# Patient Record
Sex: FEMALE | Race: WHITE | Employment: PART TIME | URBAN - METROPOLITAN AREA
[De-identification: names, ages, dates, MRNs, and addresses within clinical notes are randomized per-mention and may not be internally consistent; named-entity substitution may affect disease eponyms.]

---

## 2017-05-22 PROBLEM — R42 LIGHTHEADEDNESS: Status: ACTIVE | Noted: 2017-05-22

## 2017-05-22 PROBLEM — J45.909 ASTHMA: Status: ACTIVE | Noted: 2017-05-22

## 2017-05-22 PROBLEM — M54.50 CHRONIC LOW BACK PAIN: Status: ACTIVE | Noted: 2017-05-22

## 2017-05-22 PROBLEM — G89.29 CHRONIC LOW BACK PAIN: Status: ACTIVE | Noted: 2017-05-22

## 2017-05-22 PROBLEM — I10 HTN (HYPERTENSION): Status: ACTIVE | Noted: 2017-05-22

## 2018-06-01 ENCOUNTER — OFFICE VISIT (OUTPATIENT)
Dept: ENT CLINIC | Age: 48
End: 2018-06-01
Payer: COMMERCIAL

## 2018-06-01 ENCOUNTER — PROCEDURE VISIT (OUTPATIENT)
Dept: AUDIOLOGY | Age: 48
End: 2018-06-01
Payer: COMMERCIAL

## 2018-06-01 VITALS
HEART RATE: 67 BPM | HEIGHT: 64 IN | WEIGHT: 193.8 LBS | DIASTOLIC BLOOD PRESSURE: 54 MMHG | SYSTOLIC BLOOD PRESSURE: 128 MMHG | BODY MASS INDEX: 33.09 KG/M2

## 2018-06-01 DIAGNOSIS — R09.81 CHRONIC NASAL CONGESTION: ICD-10-CM

## 2018-06-01 DIAGNOSIS — R51.9 CHRONIC FACIAL PAIN: Primary | ICD-10-CM

## 2018-06-01 DIAGNOSIS — G89.29 CHRONIC FACIAL PAIN: Primary | ICD-10-CM

## 2018-06-01 DIAGNOSIS — R42 VERTIGO: Primary | ICD-10-CM

## 2018-06-01 PROCEDURE — G8427 DOCREV CUR MEDS BY ELIG CLIN: HCPCS | Performed by: OTOLARYNGOLOGY

## 2018-06-01 PROCEDURE — 92557 COMPREHENSIVE HEARING TEST: CPT | Performed by: AUDIOLOGIST

## 2018-06-01 PROCEDURE — 3017F COLORECTAL CA SCREEN DOC REV: CPT | Performed by: OTOLARYNGOLOGY

## 2018-06-01 PROCEDURE — 4004F PT TOBACCO SCREEN RCVD TLK: CPT | Performed by: OTOLARYNGOLOGY

## 2018-06-01 PROCEDURE — 99204 OFFICE O/P NEW MOD 45 MIN: CPT | Performed by: OTOLARYNGOLOGY

## 2018-06-01 PROCEDURE — 92567 TYMPANOMETRY: CPT | Performed by: AUDIOLOGIST

## 2018-06-01 PROCEDURE — G8417 CALC BMI ABV UP PARAM F/U: HCPCS | Performed by: OTOLARYNGOLOGY

## 2018-06-01 RX ORDER — FLUTICASONE PROPIONATE 50 MCG
2 SPRAY, SUSPENSION (ML) NASAL DAILY
Qty: 1 BOTTLE | Refills: 3 | Status: SHIPPED | OUTPATIENT
Start: 2018-06-01

## 2018-06-03 ASSESSMENT — ENCOUNTER SYMPTOMS
DOUBLE VISION: 0
VOMITING: 0
BLURRED VISION: 0
HEARTBURN: 0
COUGH: 0
SHORTNESS OF BREATH: 0

## 2018-06-15 ENCOUNTER — TELEPHONE (OUTPATIENT)
Dept: ADMINISTRATIVE | Age: 48
End: 2018-06-15

## 2018-06-18 ENCOUNTER — TELEPHONE (OUTPATIENT)
Dept: ADMINISTRATIVE | Age: 48
End: 2018-06-18

## 2018-06-29 ENCOUNTER — TELEPHONE (OUTPATIENT)
Dept: ADMINISTRATIVE | Age: 48
End: 2018-06-29

## 2018-06-29 NOTE — TELEPHONE ENCOUNTER
Patient called and wanted to cancel her CT and follow up appointment for results. She states that she went to the dentist and they told her that what was causing her pain in her sinuses was from periodontal disease and that they are having her come in for deep cleanings to clear that up and if she's still having problems then she will reschedule the CT.

## 2018-10-29 ENCOUNTER — TELEPHONE (OUTPATIENT)
Dept: NON INVASIVE DIAGNOSTICS | Age: 48
End: 2018-10-29

## 2019-08-16 ENCOUNTER — HOSPITAL ENCOUNTER (OUTPATIENT)
Dept: PSYCHIATRY | Age: 49
Discharge: HOME | End: 2019-08-16
Payer: MEDICARE

## 2019-08-16 PROCEDURE — 94250 HC DIFFUSION: CPT | Performed by: COUNSELOR

## 2019-09-04 ENCOUNTER — OFFICE VISIT (OUTPATIENT)
Dept: FAMILY MEDICINE CLINIC | Age: 49
End: 2019-09-04
Payer: MEDICARE

## 2019-09-04 VITALS
DIASTOLIC BLOOD PRESSURE: 67 MMHG | WEIGHT: 188.8 LBS | RESPIRATION RATE: 16 BRPM | BODY MASS INDEX: 33.45 KG/M2 | HEIGHT: 63 IN | OXYGEN SATURATION: 95 % | HEART RATE: 72 BPM | TEMPERATURE: 98.5 F | SYSTOLIC BLOOD PRESSURE: 116 MMHG

## 2019-09-04 DIAGNOSIS — I10 ESSENTIAL HYPERTENSION: Primary | ICD-10-CM

## 2019-09-04 DIAGNOSIS — F41.9 ANXIETY: ICD-10-CM

## 2019-09-04 DIAGNOSIS — Z72.0 TOBACCO USE: ICD-10-CM

## 2019-09-04 DIAGNOSIS — F42.2 MIXED OBSESSIONAL THOUGHTS AND ACTS: ICD-10-CM

## 2019-09-04 PROCEDURE — 99214 OFFICE O/P EST MOD 30 MIN: CPT | Performed by: FAMILY MEDICINE

## 2019-09-04 RX ORDER — BUPROPION HYDROCHLORIDE 150 MG/1
150 TABLET ORAL EVERY MORNING
Qty: 30 TABLET | Refills: 3 | Status: SHIPPED
Start: 2019-09-04 | End: 2021-07-14 | Stop reason: SDUPTHER

## 2019-09-04 RX ORDER — ATENOLOL 50 MG/1
50 TABLET ORAL DAILY
Qty: 30 TABLET | Refills: 1 | Status: SHIPPED | OUTPATIENT
Start: 2019-09-04 | End: 2019-12-26 | Stop reason: SDUPTHER

## 2019-09-04 RX ORDER — MAGNESIUM OXIDE 400 MG/1
400 TABLET ORAL DAILY
COMMUNITY

## 2019-09-04 RX ORDER — ATENOLOL 25 MG/1
25 TABLET ORAL DAILY
Qty: 30 TABLET | Refills: 3 | Status: SHIPPED | OUTPATIENT
Start: 2019-09-04 | End: 2019-12-26 | Stop reason: SDUPTHER

## 2019-09-04 NOTE — PROGRESS NOTES
potentially harmful/life threatening disease. Patient and/or guardian verbalizes understanding and agrees with above counseling, assessment and plan. All questions answered. Bogdan Coburn MD  9/4/2019    I have personally reviewed and updated the chief complaint, HPI, Past Medical, Family and Social History, as well as the above Review of Systems.

## 2019-10-16 ENCOUNTER — TELEPHONE (OUTPATIENT)
Dept: FAMILY MEDICINE CLINIC | Age: 49
End: 2019-10-16

## 2019-10-23 ENCOUNTER — TELEPHONE (OUTPATIENT)
Dept: FAMILY MEDICINE CLINIC | Age: 49
End: 2019-10-23

## 2019-12-03 ENCOUNTER — TELEPHONE (OUTPATIENT)
Dept: FAMILY MEDICINE CLINIC | Age: 49
End: 2019-12-03

## 2019-12-26 RX ORDER — ATENOLOL 25 MG/1
25 TABLET ORAL DAILY
Qty: 30 TABLET | Refills: 3 | Status: SHIPPED
Start: 2019-12-26 | End: 2020-03-04

## 2019-12-26 RX ORDER — ATENOLOL 50 MG/1
50 TABLET ORAL DAILY
Qty: 30 TABLET | Refills: 1 | Status: SHIPPED | OUTPATIENT
Start: 2019-12-26 | End: 2019-12-26 | Stop reason: SDUPTHER

## 2019-12-26 RX ORDER — ATENOLOL 50 MG/1
50 TABLET ORAL DAILY
Qty: 30 TABLET | Refills: 1 | Status: SHIPPED
Start: 2019-12-26 | End: 2020-02-10

## 2019-12-26 RX ORDER — ATENOLOL 25 MG/1
25 TABLET ORAL DAILY
Qty: 30 TABLET | Refills: 3 | Status: SHIPPED | OUTPATIENT
Start: 2019-12-26 | End: 2019-12-26 | Stop reason: SDUPTHER

## 2020-02-06 ENCOUNTER — TELEPHONE (OUTPATIENT)
Dept: FAMILY MEDICINE CLINIC | Age: 50
End: 2020-02-06

## 2020-02-10 RX ORDER — ATENOLOL 50 MG/1
50 TABLET ORAL DAILY
Qty: 30 TABLET | Refills: 1 | Status: SHIPPED
Start: 2020-02-10 | End: 2020-03-04

## 2020-03-04 RX ORDER — ATENOLOL 50 MG/1
50 TABLET ORAL DAILY
Qty: 90 TABLET | Refills: 0 | Status: SHIPPED
Start: 2020-03-04 | End: 2020-06-01

## 2020-03-04 RX ORDER — ATENOLOL 25 MG/1
TABLET ORAL
Qty: 30 TABLET | Refills: 3 | Status: SHIPPED
Start: 2020-03-04 | End: 2020-06-30

## 2020-03-04 RX ORDER — ATENOLOL 25 MG/1
TABLET ORAL
Qty: 90 TABLET | Status: CANCELLED | OUTPATIENT
Start: 2020-03-04

## 2020-03-17 ENCOUNTER — TELEPHONE (OUTPATIENT)
Dept: FAMILY MEDICINE CLINIC | Age: 50
End: 2020-03-17

## 2020-03-17 NOTE — TELEPHONE ENCOUNTER
Patient would like to get a sedative sent to [facility], she has a anxiety disorder, can't sleep and has quarantine herself in her house for the last 8 days, she drinks wine every night but will run out soon and also she is trying to quit smoking, she is going to get a patch over the counter.  Patient really would like a call from doctor

## 2020-03-17 NOTE — TELEPHONE ENCOUNTER
Grey Bal can you call this patient and see what her concerns are. I am not comfortable giving her a sedative if she is drinking every day.

## 2020-03-19 ENCOUNTER — TELEPHONE (OUTPATIENT)
Dept: FAMILY MEDICINE CLINIC | Age: 50
End: 2020-03-19

## 2020-03-19 NOTE — TELEPHONE ENCOUNTER
SW received call from patient wanting to provide an update. She reports she is supposed to be doing her phone intake with [facility] today but if she does not hear back will call them. She also reports being in contact with [facility]. She continues to perseverate on her desire to withdrawal from alcohol at home due to the current epidemic preventing her from leaving her home. Patient reports she has about 35 bottles of wine left so feels she will be okay for awhile but is concerned for when she runs out of alcohol and continues to insist on wanting someone to provide medication to her so she can \"detox from home. \" Again reiterated the importance of obtaining these services through AoD tx. Patient reports a plan to keep in contact with SW In regards to AoD IOP and will notify PCP.

## 2020-03-20 ENCOUNTER — TELEPHONE (OUTPATIENT)
Dept: FAMILY MEDICINE CLINIC | Age: 50
End: 2020-03-20

## 2020-03-20 NOTE — TELEPHONE ENCOUNTER
Patient called reporting that she had he er intake with [facility] but was told that Dr. Cayden Zacarias is not taking new patients at this time. Patient also states her previous psychiatrist, Dr. Ezequiel Horn has closed his office for the time being. She is adamant that she should be able to obtain medication to self-detox from her home. SW explained that the best would be for her to do so inpatient but she refuses to leave her home continuing to state that the president ordered for her not do so due to her being immunocompromised. She is requesting a call from Dr. Wilmer Verdin. Will route to PCP to further advise.

## 2020-03-20 NOTE — CARE COORDINATION
Pt called in requesting a \"telehealth only\" appointment with Dr. Ozzie Guzman for medication management. [facility] is still accepting new patients but all pts have to attend the initial appointment for the diagnostic assessment. Pt refuses to come to this facility for this purpose, as she is \"self-quarantined\" in her home. After speaking with Dr. Ozzie Guzman at pt's request, he decided that based on current information regarding telehealth policies, he is not comfortable doing a diagnostic assessment over the phone.     Pt reports that she will return to Dr. Vinny Santizo for this request.

## 2020-03-23 ENCOUNTER — TELEPHONE (OUTPATIENT)
Dept: FAMILY MEDICINE CLINIC | Age: 50
End: 2020-03-23

## 2020-03-24 ENCOUNTER — HOSPITAL ENCOUNTER (OUTPATIENT)
Dept: PSYCHIATRY | Age: 50
Discharge: HOME | End: 2020-03-24

## 2020-03-30 ENCOUNTER — TELEPHONE (OUTPATIENT)
Dept: FAMILY MEDICINE CLINIC | Age: 50
End: 2020-03-30

## 2020-06-01 RX ORDER — ATENOLOL 50 MG/1
50 TABLET ORAL DAILY
Qty: 90 TABLET | Refills: 0 | Status: SHIPPED
Start: 2020-06-01 | End: 2020-12-18 | Stop reason: SDUPTHER

## 2020-06-16 ENCOUNTER — TELEPHONE (OUTPATIENT)
Dept: FAMILY MEDICINE CLINIC | Age: 50
End: 2020-06-16

## 2020-06-16 NOTE — CARE COORDINATION
The following forms have been acknowledged and consented to by this client:     [x] Verbally    [] In person     [x] Consent for telehealth services   [x] Consent for treatment, payment, and health care operations   [x] VENKATESH - Receive/disclosure information authorization    - Insurance Agency = [employer]   - Emergency Contact = Leonel Lopez, daughter and Rosann Lesch, Friend - Referral Source =     Medication Assisted Treatment (if applicable)  [] Agreement for treatment with buprenorphine/naloxone  [] Consent for treatment with buprenorphine/naloxone  [] Medication assisted treatment process       6/16/20 - sent email to [email or web address] with above checked forms. Will follow up call to confirm receipt and verbal consent.  TESS

## 2020-06-16 NOTE — TELEPHONE ENCOUNTER
SW received call back from patient. She reports she lost the phone number for [facility] that SW had referred her to a few months ago. Patient states she has continued to see her psychiatrist but only virtually but that he recently said that she needed to come in for her next session. She reports this makes her really anxious and was seeking reassurance if this is safe/recommended for her. SW directed her back to current CDC guidelines. Patient reports a plan to call IOP to schedule.

## 2020-06-19 ENCOUNTER — HOSPITAL ENCOUNTER (OUTPATIENT)
Dept: PSYCHIATRY | Age: 50
Discharge: HOME | End: 2020-06-19

## 2020-06-19 ENCOUNTER — HOSPITAL ENCOUNTER (OUTPATIENT)
Dept: PSYCHIATRY | Age: 50
Setting detail: THERAPIES SERIES
Discharge: HOME | End: 2020-06-19
Payer: MEDICARE

## 2020-06-19 PROCEDURE — H0001 ALCOHOL AND/OR DRUG ASSESS: HCPCS | Performed by: COUNSELOR

## 2020-06-19 ASSESSMENT — PATIENT HEALTH QUESTIONNAIRE - PHQ9: SUM OF ALL RESPONSES TO PHQ QUESTIONS 1-9: 9

## 2020-06-19 ASSESSMENT — LIFESTYLE VARIABLES: HISTORY_ALCOHOL_USE: YES

## 2020-06-22 ENCOUNTER — HOSPITAL ENCOUNTER (OUTPATIENT)
Dept: PSYCHIATRY | Age: 50
Setting detail: THERAPIES SERIES
Discharge: HOME | End: 2020-06-22
Payer: MEDICARE

## 2020-06-22 PROCEDURE — 99443 PR PHYS/QHP TELEPHONE EVALUATION 21-30 MIN: CPT | Performed by: PSYCHIATRY & NEUROLOGY

## 2020-06-22 NOTE — H&P
DUAL DIAGNOSIS & MAT EVALUATION    CHIEF COMPLAINT:  \"I have OCD. \"    HISTORY OF PRESENT ILLNESS: Angelo Burden is a 44 y.o. female with history of OCD who presents for dual diagnostic evaluation at [facility] as a referral from primary care. Spoke with patient on phone due to coronavirus precautions. Patient location home. Provider location office. Ingrid Reddy reports OCD started in her 19's with contamination obsessions and intrusive fears of running someone over while driving. She was on Luvox with good results but stopped this about ten years ago due to weight gain. She is currently being prescribed beta blocker atenolol which she says has been helping significantly with her anxiety and OCD symptoms; however, patient reports if she does not drink a few glasses of wine a night she becomes too hypotensive from the medication. Ingrid Reddy reports she is afraid to stop drinking because she may have to lower her atenolol dose and worries her anxiety would flare up. She still performs checking compulsions even with the atenolol. No current suicidal or homicidal ideations. No major depression. No thomas or psychosis. SUBSTANCE ABUSE HISTORY: Patient reports she presented for detox once in the past but facility did not feel that she needed it. PAST PSYCHIATRIC HISTORY: Patient was on the crisis center at [address] [place] [address] Rd years ago. No psychiatric admissions or suicide attempts. No current psychiatrist or therapist. Previously was on Luvox which caused significant weight gain. PAST MEDICAL HISTORY:       Diagnosis Date    Allergic rhinitis     Anxiety     Cancer ([address]     cervical    Chronic back pain     Depression     Hypertension     Mitral valve prolapse     Osteoarthritis     Substance abuse ([facility])     alcohol in the past     ALLERGIES: Decongestant [pseudoephedrine hcl]; Ceclor [cefaclor];  Penicillins; Prednisone; Prochlorperazine edisylate; and Sulfa antibiotics    FAMILY HISTORY:

## 2020-06-24 ENCOUNTER — HOSPITAL ENCOUNTER (OUTPATIENT)
Dept: PSYCHIATRY | Age: 50
Setting detail: THERAPIES SERIES
Discharge: HOME | End: 2020-06-24
Payer: MEDICARE

## 2020-06-24 PROCEDURE — 90837 PSYTX W PT 60 MINUTES: CPT | Performed by: COUNSELOR
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
## 2020-06-25 ENCOUNTER — HOSPITAL ENCOUNTER (OUTPATIENT)
Dept: PSYCHIATRY | Age: 50
Setting detail: THERAPIES SERIES
Discharge: HOME | End: 2020-06-25
Payer: MEDICARE

## 2020-06-25 PROCEDURE — H0005 ALCOHOL AND/OR DRUG SERVICES: HCPCS | Performed by: COUNSELOR

## 2020-06-25 PROCEDURE — H2012 BEHAV HLTH DAY TREAT, PER HR: HCPCS | Performed by: COUNSELOR

## 2020-06-30 ENCOUNTER — HOSPITAL ENCOUNTER (OUTPATIENT)
Dept: PSYCHIATRY | Age: 50
Setting detail: THERAPIES SERIES
End: 2020-06-30
Payer: MEDICARE

## 2020-06-30 RX ORDER — ATENOLOL 25 MG/1
TABLET ORAL
Qty: 30 TABLET | Refills: 3 | Status: SHIPPED
Start: 2020-06-30 | End: 2020-11-30 | Stop reason: SDUPTHER

## 2020-07-17 ENCOUNTER — CLINICAL DOCUMENTATION (OUTPATIENT)
Dept: PSYCHIATRY | Age: 50
End: 2020-07-17

## 2020-07-17 NOTE — DISCHARGE SUMMARY
[address]      Client Name: [facility]  Date of Admission: 06/24/2020    Discharge Date: 07/17/2020      Diagnosis: F10.20, F42    Authorized Person's  Name: Beau West         License: [place] [employer]         Date: 7/17/2020      Degree of Severity- ADMISSION  Degree of Severity- DISCHARGE    Acute Intoxication withdrawal high Acute Intoxication withdrawal high   Biomedical Conditions/  Complications moderate Biomedical Conditions/  Complications moderate   Emotional Behavioral/ Cognitive Conditions high Emotional Behavioral/ Cognitive Conditions high   Treatment Acceptance Resistance moderate Treatment Acceptance Resistance high   Relapse Potential high Relapse Potential high   Recovery Environment high Recovery Environment high       Comments on Dimensional Criteria: Pt made the decision to not remain in treatment very long. As a result, there was not much change in this regard. Level of Care and Service Provided during Course of Treatment: 2.1 Intensive outpatient programming - dual-diagnosis IOP    Client's Response to Treatment: Pt expressed initially, high motivation for treatment. However, after a short period of time, decided to not continue treatment due to her concerns about insurance and financial liability. Pt was provided with information on financial assistance, but declined. Recommendations and/or Referral for Additional AOD Addiction Treatment or other services: Pt remains at the recommendations for Level 2.1 Intensive outpatient programming, however, states that she does not want to do this at this time.      Electronically signed by Beau Dodson LPC on 7/17/2020 at 9:18 AM

## 2020-09-24 ENCOUNTER — VIRTUAL VISIT (OUTPATIENT)
Dept: FAMILY MEDICINE CLINIC | Age: 50
End: 2020-09-24
Payer: MEDICARE

## 2020-09-24 PROCEDURE — 99214 OFFICE O/P EST MOD 30 MIN: CPT | Performed by: FAMILY MEDICINE

## 2020-09-24 SDOH — ECONOMIC STABILITY: INCOME INSECURITY: HOW HARD IS IT FOR YOU TO PAY FOR THE VERY BASICS LIKE FOOD, HOUSING, MEDICAL CARE, AND HEATING?: NOT HARD AT ALL

## 2020-09-24 SDOH — ECONOMIC STABILITY: FOOD INSECURITY: WITHIN THE PAST 12 MONTHS, THE FOOD YOU BOUGHT JUST DIDN'T LAST AND YOU DIDN'T HAVE MONEY TO GET MORE.: NEVER TRUE

## 2020-09-24 SDOH — ECONOMIC STABILITY: FOOD INSECURITY: WITHIN THE PAST 12 MONTHS, YOU WORRIED THAT YOUR FOOD WOULD RUN OUT BEFORE YOU GOT MONEY TO BUY MORE.: NEVER TRUE

## 2020-09-24 NOTE — PROGRESS NOTES
TeleMedicine Patient Consent    This visit was performed as a virtual video visit using a synchronous, two-way, audio-video telehealth technology platform. Patient identification was verified at the start of the visit, including the patient's telephone number and physical location. I discussed with the patient the nature of our telehealth visits, that:     1. Due to the nature of an audio- video modality, the only components of a physical exam that could be done are the elements supported by direct observation. 2. I would evaluate the patient and recommend diagnostics and treatments based on my assessment. 3. If it was felt that the patient should be evaluated in clinic or an emergency room setting, then they would be directed there. 4. Our sessions are not being recorded and that personal health information is protected. 5. Our team would provide follow up care in person if/when the patient needs it. Patient does agree to proceed with telemedicine consultation. Patient's location: home address in [place]. Is there anyone else present for this visit: No  This visit was completed virtually using [employer]. me    Physician Location:   [place] [address]    Time spent: Greater than Not billed by time    9/24/2020    TELEHEALTH EVALUATION -- Audio or Visual (During TUALT-50 public health emergency)    HPI:  Anxiety and/or depression:  Patient is here with complaints of anxiety and/or depression. This is a/an chronic problem. This has been going on for many years. Treatment in the past includes Atenolol and Wellbtrin years ago. Anxiety symptoms include worrying and difficulty concentrating. Depressive symptoms include crying, sadness, lack of motivation. Patient does not have suicidal or homicidal ideation. Patient is  having issues falling or staying asleep. Patient is not having associated panic attacks.   The above is  interfering with quality of life.  Patient has seen a Counselor before. Patient does use alcohol and/or drugs. She is thinking of going into the crisis center. Her boyfriend  on Friday and she found out he was living with someone else. She is devastated. She wants to stop drinking. She does see Dr. Eduardo Shah. Patient's past medical, surgical, social and/or family history reviewed, updated in chart, and are non-contributory (unless otherwise stated). Medications and allergies also reviewed and updated in chart.          Noy King (:  1970) has requested an audio/video evaluation for the following concern(s):        ROS Unless otherwise specified  Review of Systems - General ROS: negative for - chills, fatigue, fever, night sweats, sleep disturbance, weight gain or weight loss  Psychological ROS: negative for - anxiety, behavioral disorder, depression, hallucinations, irritability, memory difficulties, mood swings, sleep disturbances or suicidal ideation  ENT ROS: negative for - epistaxis, headaches, hearing change, nasal congestion, nasal discharge, nasal polyps, sinus pain, tinnitus, vertigo or visual changes  Hematological and Lymphatic ROS: negative for - bleeding problems, blood clots, fatigue or swollen lymph nodes  Respiratory ROS: negative for - cough, orthopnea, shortness of breath, sputum changes, tachypnea or wheezing  Cardiovascular ROS: negative for - chest pain, dyspnea on exertion, irregular heartbeat, loss of consciousness, palpitations, paroxysmal nocturnal dyspnea or rapid heart rate  Gastrointestinal ROS: negative for - abdominal pain, blood in stools, change in bowel habits, constipation, diarrhea, gas/bloating, heartburn or nausea/vomiting  Musculoskeletal ROS: negative for - joint pain, joint stiffness, joint swelling or muscle, back pain, bowel or bladder incontinence  Neurological ROS: negative for - behavioral changes, confusion, dizziness, headaches, memory loss, numbness/tingling, seizures or speech problems, weakness  Dermatological ROS: negative for - dry skin, mole changes, nail changes, pruritus, rash or skin lesion changes    Prior to Visit Medications    Medication Sig Taking?  Authorizing Provider   atenolol (TENORMIN) 25 MG tablet TAKE 1 TABLET BY MOUTH EVERY DAY ALONG WITH 50MG TO EQUAL 75MG TOTAL DOSE Yes Anjum Cheung MD   atenolol (TENORMIN) 50 MG tablet TAKE 1 TABLET BY MOUTH DAILY Yes Anjum Cheung MD   OIL OF OREGANO PO Take 1 drop by mouth daily Yes Historical Provider, MD   Coenzyme Q10 (COQ10 PO) Take by mouth daily Yes Historical Provider, MD   Omega-3 Fatty Acids (OMEGA-3 FISH OIL PO) Take by mouth daily Yes Historical Provider, MD   TURMERIC CURCUMIN PO Take by mouth daily  Yes Historical Provider, MD   Cholecalciferol (VITAMIN D3) 5000 UNITS TABS Take by mouth daily  Yes Historical Provider, MD   Pediatric Multivit-Minerals-C (CHILDRENS MULTIVITAMIN PO) Take by mouth daily  Yes Historical Provider, MD   magnesium oxide (MAG-OX) 400 MG tablet Take 400 mg by mouth daily  Historical Provider, MD   buPROPion (WELLBUTRIN XL) 150 MG extended release tablet Take 1 tablet by mouth every morning  Patient not taking: Reported on 9/24/2020  Anjum Cheung MD   fluticasone [facility]) 50 MCG/ACT nasal spray 2 sprays by Nasal route daily 2 sprays each nostril once a day  Patient not taking: Reported on 9/4/2019  [facility] , DO       Social History     Tobacco Use    Smoking status: Current Every Day Smoker     Packs/day: 0.50     Types: Cigarettes    Smokeless tobacco: Never Used   Substance Use Topics    Alcohol use: Yes     Comment: 2 glasses of wine per day    Drug use: No        Allergies   Allergen Reactions    Decongestant [Pseudoephedrine Hcl] Palpitations     Severe pounding in ears and patient passed out     Ceclor [Cefaclor]     Penicillins Other (See Comments)     Pt not sure      Prednisone Other (See Comments)     yelling    Prochlorperazine Edisylate negative item  -- DELETE ALL ITEMS NOT EXAMINED]  Vital Signs: (As obtained by patient/caregiver or practitioner observation)    Blood pressure-  Heart rate-    Respiratory rate-    Temperature-  Pulse oximetry-     Constitutional: [x] Appears well-developed and well-nourished [] No apparent distress      [x] Abnormal-   Mental status  [x] Alert and awake  [x] Oriented to person/place/time [x]Able to follow commands      Eyes:  EOM    [x]  Normal  [] Abnormal-  Sclera  [x]  Normal  [] Abnormal -         Discharge [x]  None visible  [] Abnormal -    HENT:   [x] Normocephalic, atraumatic. [] Abnormal   [x] Mouth/Throat: Mucous membranes are moist.     External Ears [x] Normal  [] Abnormal-     Neck: [x] No visualized mass     Pulmonary/Chest: [x] Respiratory effort normal.  [x] No visualized signs of difficulty breathing or respiratory distress        [] Abnormal-      Musculoskeletal:   [] Normal gait with no signs of ataxia         [x] Normal range of motion of neck        [] Abnormal-       Neurological:        [x] No Facial Asymmetry (Cranial nerve 7 motor function) (limited exam to video visit)          [] No gaze palsy        [] Abnormal-         Skin:        [x] No significant exanthematous lesions or discoloration noted on facial skin         [] Abnormal-            Psychiatric:       [x] Normal Affect [x] No Hallucinations        [] Abnormal-       Other pertinent observable physical exam findings-     Due to this being a TeleHealth encounter, evaluation of the following organ systems is limited: Vitals/Constitutional/EENT/Resp/CV/GI//MS/Neuro/Skin/Heme-Lymph-Imm. ASSESSMENT/PLAN:  Jossue Condon was seen today for other. Diagnoses and all orders for this visit:    Mixed obsessional thoughts and acts    Anxiety    Alcohol dependence with alcohol-induced mood disorder ([facility] [address]    Pt to admit herself to the crisis center next week. She admits she needs to stop drinking and cannot on her own.      No follow-ups on file.    An  electronic signature was used to authenticate this note. --Gunner Mars MD on 9/25/2020 at 10:28 }    Pursuant to the emergency declaration under the [address] [place] [address], [facility] waiver authority and the [employer] and [employer] Act, this Virtual  Visit was conducted, with patient's consent, to reduce the patient's risk of exposure to COVID-19 and provide continuity of care for an established patient. Services were provided through a video synchronous discussion virtually to substitute for in-person clinic visit.

## 2020-09-25 ENCOUNTER — TELEPHONE (OUTPATIENT)
Dept: FAMILY MEDICINE CLINIC | Age: 50
End: 2020-09-25

## 2020-09-25 NOTE — TELEPHONE ENCOUNTER
SW called patient per PCP request due to patient reporting a plan to go to [facility] Crisis Unit and needing medical clearance by PCP. Called and spoke with patient who reports she has been in contact with [facility] over the past few days. She reports she has been struggling with the loss of her SO and the traumatic stressors that have developed as a result. She reports a desire to stop drinking as well as \"get back on my medication. \" Patient has been very fearful of leaving her home/the hospital since covid began so the crisis unit is where she reports feeling more comfortable. No current emergent needs. Patient reports support from her daughter and other family members who have been bringing her food and helping to care for her. Patient has appointment for medical clearance next Wednesday with a plan to be admitted to crisis unit on Thursday. Patient was told to call the unit on Tuesday to determine if bed availability. She reports a plan to keep PCP updated. [facility] called [address] Crisis Unit and inquired about specific tests required for medical clearance. Spoke with Katie Chaudhary who reports patients require a basic CBC, CMP, EKG, and Urine drug screen. Notified PCP.

## 2020-11-30 RX ORDER — ATENOLOL 25 MG/1
TABLET ORAL
Qty: 30 TABLET | Refills: 3 | Status: SHIPPED
Start: 2020-11-30 | End: 2020-12-18 | Stop reason: SDUPTHER

## 2020-12-18 ENCOUNTER — VIRTUAL VISIT (OUTPATIENT)
Dept: FAMILY MEDICINE CLINIC | Age: 50
End: 2020-12-18
Payer: MEDICARE

## 2020-12-18 PROCEDURE — 99213 OFFICE O/P EST LOW 20 MIN: CPT | Performed by: FAMILY MEDICINE

## 2020-12-18 RX ORDER — ATENOLOL 25 MG/1
TABLET ORAL
Qty: 30 TABLET | Refills: 11 | Status: SHIPPED
Start: 2020-12-18 | End: 2021-07-14 | Stop reason: SDUPTHER

## 2020-12-18 RX ORDER — ATENOLOL 50 MG/1
50 TABLET ORAL DAILY
Qty: 30 TABLET | Refills: 11 | Status: SHIPPED
Start: 2020-12-18 | End: 2021-07-14 | Stop reason: SDUPTHER

## 2020-12-18 RX ORDER — CLINDAMYCIN HYDROCHLORIDE 300 MG/1
300 CAPSULE ORAL 3 TIMES DAILY
Qty: 30 CAPSULE | Refills: 0 | Status: SHIPPED | OUTPATIENT
Start: 2020-12-18 | End: 2020-12-28

## 2020-12-18 ASSESSMENT — PATIENT HEALTH QUESTIONNAIRE - PHQ9
SUM OF ALL RESPONSES TO PHQ QUESTIONS 1-9: 0
SUM OF ALL RESPONSES TO PHQ9 QUESTIONS 1 & 2: 0
1. LITTLE INTEREST OR PLEASURE IN DOING THINGS: 0
SUM OF ALL RESPONSES TO PHQ QUESTIONS 1-9: 0
SUM OF ALL RESPONSES TO PHQ QUESTIONS 1-9: 0
2. FEELING DOWN, DEPRESSED OR HOPELESS: 0

## 2020-12-18 NOTE — PROGRESS NOTES
TeleMedicine Patient Consent    This visit was performed as a virtual video visit using a synchronous, two-way, audio-video telehealth technology platform. Patient identification was verified at the start of the visit, including the patient's telephone number and physical location. I discussed with the patient the nature of our telehealth visits, that:     1. Due to the nature of an audio- video modality, the only components of a physical exam that could be done are the elements supported by direct observation. 2. I would evaluate the patient and recommend diagnostics and treatments based on my assessment. 3. If it was felt that the patient should be evaluated in clinic or an emergency room setting, then they would be directed there. 4. Our sessions are not being recorded and that personal health information is protected. 5. Our team would provide follow up care in person if/when the patient needs it. Patient does agree to proceed with telemedicine consultation. Patient's location: home address in [place]. Is there anyone else present for this visit: No  This visit was completed virtually using [email or web address] me    Physician Location:   [place] [address]    Time spent: Greater than Not billed by time    2020    TELEHEALTH EVALUATION -- Audio or Visual (During WQZFN-22 public health emergency)    HPI: Pt c/o boil under L armpit. It is painful. It has been there for few weeks. No drainage. It is somewhat smaller. She feels like it needs drained.      Qamar Alfaro (:  1970) has requested an audio/video evaluation for the following concern(s):        ROS Unless otherwise specified  Review of Systems - General ROS: negative for - chills, fatigue, fever, night sweats, sleep disturbance, weight gain or weight loss Psychological ROS: negative for - anxiety, behavioral disorder, depression, hallucinations, irritability, memory difficulties, mood swings, sleep disturbances or suicidal ideation  ENT ROS: negative for - epistaxis, headaches, hearing change, nasal congestion, nasal discharge, nasal polyps, sinus pain, tinnitus, vertigo or visual changes  Hematological and Lymphatic ROS: negative for - bleeding problems, blood clots, fatigue or swollen lymph nodes  Respiratory ROS: negative for - cough, orthopnea, shortness of breath, sputum changes, tachypnea or wheezing  Cardiovascular ROS: negative for - chest pain, dyspnea on exertion, irregular heartbeat, loss of consciousness, palpitations, paroxysmal nocturnal dyspnea or rapid heart rate  Gastrointestinal ROS: negative for - abdominal pain, blood in stools, change in bowel habits, constipation, diarrhea, gas/bloating, heartburn or nausea/vomiting  Musculoskeletal ROS: negative for - joint pain, joint stiffness, joint swelling or muscle, back pain, bowel or bladder incontinence  Neurological ROS: negative for - behavioral changes, confusion, dizziness, headaches, memory loss, numbness/tingling, seizures or speech problems, weakness  Dermatological ROS: negative for - dry skin, mole changes, nail changes, pruritus, rash or skin lesion changes    Prior to Visit Medications    Medication Sig Taking?  Authorizing Provider   clindamycin (CLEOCIN) 300 MG capsule Take 1 capsule by mouth 3 times daily for 10 days Yes Dianne Santa MD   atenolol (TENORMIN) 25 MG tablet TAKE 1 TABLET BY MOUTH EVERY DAY ALONG WITH 50MG TO EQUAL 75MG TOTAL DOSE Yes Dianne Santa MD   atenolol (TENORMIN) 50 MG tablet Take 1 tablet by mouth daily Yes Dianne Santa MD   OIL OF OREGANO PO Take 1 drop by mouth daily Yes Historical Provider, MD   Coenzyme Q10 (COQ10 PO) Take by mouth daily Yes Historical Provider, MD Omega-3 Fatty Acids (OMEGA-3 FISH OIL PO) Take by mouth daily Yes Historical Provider, MD   TURMERIC CURCUMIN PO Take by mouth daily  Yes Historical Provider, MD   Cholecalciferol (VITAMIN D3) 5000 UNITS TABS Take by mouth daily  Yes Historical Provider, MD   Pediatric Multivit-Minerals-C (CHILDRENS MULTIVITAMIN PO) Take by mouth daily  Yes Historical Provider, MD   magnesium oxide (MAG-OX) 400 MG tablet Take 400 mg by mouth daily  Historical Provider, MD   buPROPion (WELLBUTRIN XL) 150 MG extended release tablet Take 1 tablet by mouth every morning  Patient not taking: Reported on 9/24/2020  Rdudy Al MD   fluticasone [facility]) 50 MCG/ACT nasal spray 2 sprays by Nasal route daily 2 sprays each nostril once a day  Patient not taking: Reported on 9/4/2019  Serge Negron DO       Social History     Tobacco Use    Smoking status: Current Every Day Smoker     Packs/day: 0.50     Types: Cigarettes    Smokeless tobacco: Never Used   Substance Use Topics    Alcohol use: Yes     Comment: 2 glasses of wine per day    Drug use: No        Allergies   Allergen Reactions    Decongestant [Pseudoephedrine Hcl] Palpitations     Severe pounding in ears and patient passed out     Ceclor [Cefaclor]     Penicillins Other (See Comments)     Pt not sure      Prednisone Other (See Comments)     yelling    Prochlorperazine Edisylate Swelling     (Compazine)    Sulfa Antibiotics    ,   Past Medical History:   Diagnosis Date    Allergic rhinitis     Anxiety     Cancer ([facility] [address]     cervical    Chronic back pain     Depression     Hypertension     Mitral valve prolapse     Osteoarthritis     Substance abuse ([facility] [address]     alcohol in the past   ,   Past Surgical History:   Procedure Laterality Date    CERVIX LESION DESTRUCTION      TUBAL LIGATION      WISDOM TOOTH EXTRACTION     ,   Social History     Tobacco Use    Smoking status: Current Every Day Smoker     Packs/day: 0.50     Types: Cigarettes  Smokeless tobacco: Never Used   Substance Use Topics    Alcohol use: Yes     Comment: 2 glasses of wine per day    Drug use: No   ,   Family History   Problem Relation Age of Onset    Diabetes Mother     Heart Disease Mother     Obesity Mother     Parkinsonism Father     Anxiety Disorder Father     Depression Father     Other Sister         RA    Cancer Maternal Grandmother         breast    No Known Problems Maternal Grandfather     No Known Problems Paternal Grandmother     No Known Problems Paternal Grandfather     Cancer Sister         melanoma    Kidney Disease Sister         kidney stones   ,   There is no immunization history on file for this patient.,   Health Maintenance   Topic Date Due    Pneumococcal 0-64 years Vaccine (1 of 1 - PPSV23) 03/16/1974    HIV screen  03/16/1983    DTaP/Tdap/Td vaccine (1 - Tdap) 03/16/1987    Cervical cancer screen  03/16/1989    Shingles Vaccine (1 of 2) 03/16/2018    Colon cancer screen colonoscopy  03/16/2018    Breast cancer screen  01/30/2019    Flu vaccine (1) 09/01/2020    Annual Wellness Visit (AWV)  12/06/2020    Lipid screen  03/08/2022    Hepatitis A vaccine  Aged Out    Hepatitis B vaccine  Aged Out    Hib vaccine  Aged Out    Meningococcal (ACWY) vaccine  Aged Out       PHYSICAL EXAMINATION:  [ INSTRUCTIONS:  \"[x]\" Indicates a positive item  \"[]\" Indicates a negative item  -- DELETE ALL ITEMS NOT EXAMINED]  Vital Signs: (As obtained by patient/caregiver or practitioner observation)    Blood pressure-  Heart rate-    Respiratory rate-    Temperature-  Pulse oximetry-     Constitutional: [x] Appears well-developed and well-nourished [] No apparent distress      [x] Abnormal-   Mental status  [x] Alert and awake  [x] Oriented to person/place/time [x]Able to follow commands      Eyes:  EOM    [x]  Normal  [] Abnormal-  Sclera  [x]  Normal  [] Abnormal -         Discharge [x]  None visible  [] Abnormal -    HENT: [x] Normocephalic, atraumatic. [] Abnormal   [x] Mouth/Throat: Mucous membranes are moist.     External Ears [x] Normal  [] Abnormal-     Neck: [x] No visualized mass     Pulmonary/Chest: [x] Respiratory effort normal.  [x] No visualized signs of difficulty breathing or respiratory distress        [] Abnormal-      Musculoskeletal:   [] Normal gait with no signs of ataxia         [x] Normal range of motion of neck        [] Abnormal-       Neurological:        [x] No Facial Asymmetry (Cranial nerve 7 motor function) (limited exam to video visit)          [] No gaze palsy        [] Abnormal-         Skin:        [x] No significant exanthematous lesions or discoloration noted on facial skin         [] Abnormal-            Psychiatric:       [x] Normal Affect [x] No Hallucinations        [] Abnormal-       Other pertinent observable physical exam findings-     Due to this being a TeleHealth encounter, evaluation of the following organ systems is limited: Vitals/Constitutional/EENT/Resp/CV/GI//MS/Neuro/Skin/Heme-Lymph-Imm. ASSESSMENT/PLAN:  Maria Isabel Apodaca was seen today for other. Diagnoses and all orders for this visit:    Boil    Other orders  -     clindamycin (CLEOCIN) 300 MG capsule; Take 1 capsule by mouth 3 times daily for 10 days  -     atenolol (TENORMIN) 25 MG tablet; TAKE 1 TABLET BY MOUTH EVERY DAY ALONG WITH 50MG TO EQUAL 75MG TOTAL DOSE  -     atenolol (TENORMIN) 50 MG tablet; Take 1 tablet by mouth daily        No follow-ups on file. An  electronic signature was used to authenticate this note.     --Jovanna Richey MD on 12/18/2020 at 12:59 } Pursuant to the emergency declaration under the [facility], [facility] waiver authority and the [employer] Act, this Virtual  Visit was conducted, with patient's consent, to reduce the patient's risk of exposure to COVID-19 and provide continuity of care for an established patient. Services were provided through a video synchronous discussion virtually to substitute for in-person clinic visit.

## 2020-12-21 ENCOUNTER — TELEPHONE (OUTPATIENT)
Dept: FAMILY MEDICINE CLINIC | Age: 50
End: 2020-12-21

## 2020-12-22 NOTE — TELEPHONE ENCOUNTER
Which three toes is it? Only the 1st and 5th toes in the forefoot require a boot. The rest use a postop shoe. If it is the toe and not the forefoot, it does not require either.

## 2020-12-24 ENCOUNTER — TELEPHONE (OUTPATIENT)
Dept: FAMILY MEDICINE CLINIC | Age: 50
End: 2020-12-24

## 2020-12-24 NOTE — LETTER
[facility]  [address] [place] [id number]  Phone: [phone]  Fax: [phone]    Dianne Santa MD        December 24, 2020     Patient: Jillian Brown   YOB: 1970   Date of Visit:        To Whom it May Concern:    Please excuse Marcel Hamiltonin from work on 12/23/20. .    If you have any questions or concerns, please don't hesitate to call.     Sincerely,         Dianne Santa MD

## 2021-01-19 ENCOUNTER — TELEPHONE (OUTPATIENT)
Dept: FAMILY MEDICINE CLINIC | Age: 51
End: 2021-01-19

## 2021-01-19 NOTE — TELEPHONE ENCOUNTER
She already  Got an excuse   tO not work longer than a 5 hr shift. She now wants an excuse stating that she can work her 8 hour day into 2 -4hr  Shifts.  ,

## 2021-02-01 ENCOUNTER — TELEPHONE (OUTPATIENT)
Dept: FAMILY MEDICINE CLINIC | Age: 51
End: 2021-02-01

## 2021-02-01 NOTE — TELEPHONE ENCOUNTER
Patient wants new letter stating: Debbie Paredes needs to have her 8 1/2 hour shift split into two. Example- Saturday shift needs to be 10-2 and 4-8, both shifts can be worked on same day. Ming Johsnon would also benefit from having a 3 five minute breaks instead of 2 five minute breaks in a 5 hour shift, and this is due to her disabilities. Patient wants this letter read to her before being faxed to ATTN: Danielle Craig at [phone].

## 2021-02-02 NOTE — TELEPHONE ENCOUNTER
Patient also wants a letter stating she can't work over 28 hours faxed to [facility] [phone]

## 2021-03-03 ENCOUNTER — VIRTUAL VISIT (OUTPATIENT)
Dept: FAMILY MEDICINE CLINIC | Age: 51
End: 2021-03-03
Payer: MEDICARE

## 2021-03-03 ENCOUNTER — TELEPHONE (OUTPATIENT)
Dept: FAMILY MEDICINE CLINIC | Age: 51
End: 2021-03-03

## 2021-03-03 DIAGNOSIS — L02.92 BOIL: Primary | ICD-10-CM

## 2021-03-03 PROCEDURE — 99213 OFFICE O/P EST LOW 20 MIN: CPT | Performed by: FAMILY MEDICINE

## 2021-03-03 ASSESSMENT — PATIENT HEALTH QUESTIONNAIRE - PHQ9: SUM OF ALL RESPONSES TO PHQ9 QUESTIONS 1 & 2: 0

## 2021-03-03 NOTE — TELEPHONE ENCOUNTER
PCP requested SW assist patient with psychiatry referral. Patient reports currently a patient at [facility] seeing [place]. States she was previously seeing Dr. Verito Jensen but does not want to any longer. Discussed option of getting linked with Psych at [address] since she is already an established patient there. Patient states it is very busy there and was wanting somewhere else. Provided option of Dr. Blanc How or [place] but explained most places also want you to be established with counselor there as well. Patient took down information. Agreed to call with further questions/concerns.

## 2021-03-03 NOTE — PROGRESS NOTES
behavioral disorder, depression, hallucinations, irritability, memory difficulties, mood swings, sleep disturbances or suicidal ideation  ENT ROS: negative for - epistaxis, headaches, hearing change, nasal congestion, nasal discharge, nasal polyps, sinus pain, tinnitus, vertigo or visual changes  Hematological and Lymphatic ROS: negative for - bleeding problems, blood clots, fatigue or swollen lymph nodes  Respiratory ROS: negative for - cough, orthopnea, shortness of breath, sputum changes, tachypnea or wheezing  Cardiovascular ROS: negative for - chest pain, dyspnea on exertion, irregular heartbeat, loss of consciousness, palpitations, paroxysmal nocturnal dyspnea or rapid heart rate  Gastrointestinal ROS: negative for - abdominal pain, blood in stools, change in bowel habits, constipation, diarrhea, gas/bloating, heartburn or nausea/vomiting  Musculoskeletal ROS: negative for - joint pain, joint stiffness, joint swelling or muscle, back pain, bowel or bladder incontinence  Neurological ROS: negative for - behavioral changes, confusion, dizziness, headaches, memory loss, numbness/tingling, seizures or speech problems, weakness  Dermatological ROS: negative for - dry skin, mole changes, nail changes, pruritus, rash or skin lesion changes    Prior to Visit Medications    Medication Sig Taking?  Authorizing Provider   atenolol (TENORMIN) 25 MG tablet TAKE 1 TABLET BY MOUTH EVERY DAY ALONG WITH 50MG TO EQUAL 75MG TOTAL DOSE Yes Kae Obrien MD   atenolol (TENORMIN) 50 MG tablet Take 1 tablet by mouth daily Yes Kae Obrien MD   OIL OF OREGANO PO Take 1 drop by mouth daily Yes Historical Provider, MD   Coenzyme Q10 (COQ10 PO) Take by mouth daily Yes Historical Provider, MD   Omega-3 Fatty Acids (OMEGA-3 FISH OIL PO) Take by mouth daily Yes Historical Provider, MD   TURMERIC CURCUMIN PO Take by mouth daily  Yes Historical Provider, MD   Cholecalciferol (VITAMIN D3) 5000 UNITS TABS Take by mouth daily  Yes Historical Provider, MD   Pediatric Multivit-Minerals-C (CHILDRENS MULTIVITAMIN PO) Take by mouth daily  Yes Historical Provider, MD   magnesium oxide (MAG-OX) 400 MG tablet Take 400 mg by mouth daily  Historical Provider, MD   buPROPion (WELLBUTRIN XL) 150 MG extended release tablet Take 1 tablet by mouth every morning  Patient not taking: Reported on 9/24/2020  Kailyn Woo MD   fluticasone [facility]) 50 MCG/ACT nasal spray 2 sprays by Nasal route daily 2 sprays each nostril once a day  Patient not taking: Reported on 9/4/2019  Woody Castro, DO       Social History     Tobacco Use    Smoking status: Current Every Day Smoker     Packs/day: 0.50     Types: Cigarettes    Smokeless tobacco: Never Used   Substance Use Topics    Alcohol use: Yes     Comment: 2 glasses of wine per day    Drug use: No        Allergies   Allergen Reactions    Decongestant [Pseudoephedrine Hcl] Palpitations     Severe pounding in ears and patient passed out     Ceclor [Cefaclor]     Clindamycin/Lincomycin      Can't remember reaction    Penicillins Other (See Comments)     Pt not sure      Prednisone Other (See Comments)     yelling    Prochlorperazine Edisylate Swelling     (Compazine)    Sulfa Antibiotics    ,   Past Medical History:   Diagnosis Date    Allergic rhinitis     Anxiety     Cancer ([facility] [address]     cervical    Chronic back pain     Depression     Hypertension     Mitral valve prolapse     Osteoarthritis     Substance abuse ([facility] [address]     alcohol in the past   ,   Past Surgical History:   Procedure Laterality Date    CERVIX LESION DESTRUCTION      TUBAL LIGATION      WISDOM TOOTH EXTRACTION     ,   Social History     Tobacco Use    Smoking status: Current Every Day Smoker     Packs/day: 0.50     Types: Cigarettes    Smokeless tobacco: Never Used   Substance Use Topics    Alcohol use: Yes     Comment: 2 glasses of wine per day    Drug use: No   ,   Family History   Problem Relation Age of Onset   

## 2021-04-06 ENCOUNTER — VIRTUAL VISIT (OUTPATIENT)
Dept: FAMILY MEDICINE CLINIC | Age: 51
End: 2021-04-06
Payer: MEDICARE

## 2021-04-06 DIAGNOSIS — M79.641 PAIN OF RIGHT HAND: Primary | ICD-10-CM

## 2021-04-06 PROCEDURE — 99213 OFFICE O/P EST LOW 20 MIN: CPT | Performed by: FAMILY MEDICINE

## 2021-04-06 NOTE — PROGRESS NOTES
TeleMedicine Patient Consent    This visit was performed as a virtual video visit using a synchronous, two-way, audio-video telehealth technology platform. Patient identification was verified at the start of the visit, including the patient's telephone number and physical location. I discussed with the patient the nature of our telehealth visits, that:     Due to the nature of an audio- video modality, the only components of a physical exam that could be done are the elements supported by direct observation. I would evaluate the patient and recommend diagnostics and treatments based on my assessment. If it was felt that the patient should be evaluated in clinic or an emergency room setting, then they would be directed there. Our sessions are not being recorded and that personal health information is protected. Our team would provide follow up care in person if/when the patient needs it. Patient does agree to proceed with telemedicine consultation. Patient's location: home address in [place]. Is there anyone else present for this visit: No  This visit was completed virtually using [email or web address]. me    Physician Location:   [place] [address]    Time spent: Greater than Not billed by time    4/6/2021    TELEHEALTH EVALUATION -- Audio or Visual (During HJTNG-90 public health emergency)    HPI:  Pain:  Patient is here today with complaints of R wrist pain. This is a/an acute problem. This has been going on for 5 day(s). Exacerbating factors include using her mouse. Alleviating factors include rest and ice. Pain is achy in nature. 6/10. Pain is not radiating. This has not happen to patient before. Imaging to date includes none. Therapy to date includes none. Labs to date include none. Patient's past medical, surgical, social and/or family history reviewed, updated in chart, and are non-contributory (unless otherwise stated).   Medications and past   ,   Past Surgical History:   Procedure Laterality Date    CERVIX LESION DESTRUCTION      TUBAL LIGATION      WISDOM TOOTH EXTRACTION     ,   Social History     Tobacco Use    Smoking status: Current Every Day Smoker     Packs/day: 0.50     Types: Cigarettes    Smokeless tobacco: Never Used   Substance Use Topics    Alcohol use: Yes     Comment: 2 glasses of wine per day    Drug use: No   ,   Family History   Problem Relation Age of Onset    Diabetes Mother     Heart Disease Mother     Obesity Mother     Parkinsonism Father     Anxiety Disorder Father     Depression Father     Other Sister         RA    Cancer Maternal Grandmother         breast    No Known Problems Maternal Grandfather     No Known Problems Paternal Grandmother     No Known Problems Paternal Grandfather     Cancer Sister         melanoma    Kidney Disease Sister         kidney stones   ,   There is no immunization history on file for this patient.,   Health Maintenance   Topic Date Due    Hepatitis C screen  Never done    Pneumococcal 0-64 years Vaccine (1 of 1 - PPSV23) Never done    HIV screen  Never done    COVID-19 Vaccine (1) Never done    DTaP/Tdap/Td vaccine (1 - Tdap) Never done    Cervical cancer screen  Never done    Shingles Vaccine (1 of 2) Never done    Colon cancer screen colonoscopy  Never done    Breast cancer screen  01/30/2019    Annual Wellness Visit (AWV)  Never done    Flu vaccine (Season Ended) 09/01/2021    Lipid screen  03/08/2022    Hepatitis A vaccine  Aged Out    Hepatitis B vaccine  Aged Out    Hib vaccine  Aged Out    Meningococcal (ACWY) vaccine  Aged Out       PHYSICAL EXAMINATION:  [ INSTRUCTIONS:  \"[x]\" Indicates a positive item  \"[]\" Indicates a negative item  -- DELETE ALL ITEMS NOT EXAMINED]  Vital Signs: (As obtained by patient/caregiver or practitioner observation)    Blood pressure-  Heart rate-    Respiratory rate-    Temperature-  Pulse oximetry-     Constitutional: the patient's risk of exposure to COVID-19 and provide continuity of care for an established patient. Services were provided through a video synchronous discussion virtually to substitute for in-person clinic visit.

## 2021-05-19 ENCOUNTER — VIRTUAL VISIT (OUTPATIENT)
Dept: FAMILY MEDICINE CLINIC | Age: 51
End: 2021-05-19
Payer: MEDICARE

## 2021-05-19 DIAGNOSIS — M25.531 RIGHT WRIST PAIN: Primary | ICD-10-CM

## 2021-05-19 PROCEDURE — 99213 OFFICE O/P EST LOW 20 MIN: CPT | Performed by: FAMILY MEDICINE

## 2021-05-19 NOTE — PROGRESS NOTES
TeleMedicine Patient Consent    This visit was performed as a virtual video visit using a synchronous, two-way, audio-video telehealth technology platform. Patient identification was verified at the start of the visit, including the patient's telephone number and physical location. I discussed with the patient the nature of our telehealth visits, that:     Due to the nature of an audio- video modality, the only components of a physical exam that could be done are the elements supported by direct observation. I would evaluate the patient and recommend diagnostics and treatments based on my assessment. If it was felt that the patient should be evaluated in clinic or an emergency room setting, then they would be directed there. Our sessions are not being recorded and that personal health information is protected. Our team would provide follow up care in person if/when the patient needs it. Patient does agree to proceed with telemedicine consultation. Patient's location: home address in [place]. Is there anyone else present for this visit: No  This visit was completed virtually using [employer]. me    Physician Location:   [place] [address]    Time spent: Greater than Not billed by time    5/19/2021    TELEHEALTH EVALUATION -- Audio or Visual (During VTZTY-84 public health emergency)    HPI:  Pain:  Patient is here today with complaints of R wrist pain. This is a/an recent problem. This has been going on for 2 month(s). Exacerbating factors include typing on the computer. Alleviating factors include none. Pain is achy in nature. 7/10. Pain is  radiating. This has not happen to patient before. Imaging to date includes none. Therapy to date includes none. Labs to date include none. Patient's past medical, surgical, social and/or family history reviewed, updated in chart, and are non-contributory (unless otherwise stated).   Medications and allergies also reviewed and updated in chart. Luke Malin (:  1970) has requested an audio/video evaluation for the following concern(s):        ROS Unless otherwise specified  Review of Systems - General ROS: negative for - chills, fatigue, fever, night sweats, sleep disturbance, weight gain or weight loss  Psychological ROS: negative for - anxiety, behavioral disorder, depression, hallucinations, irritability, memory difficulties, mood swings, sleep disturbances or suicidal ideation  ENT ROS: negative for - epistaxis, headaches, hearing change, nasal congestion, nasal discharge, nasal polyps, sinus pain, tinnitus, vertigo or visual changes  Hematological and Lymphatic ROS: negative for - bleeding problems, blood clots, fatigue or swollen lymph nodes  Respiratory ROS: negative for - cough, orthopnea, shortness of breath, sputum changes, tachypnea or wheezing  Cardiovascular ROS: negative for - chest pain, dyspnea on exertion, irregular heartbeat, loss of consciousness, palpitations, paroxysmal nocturnal dyspnea or rapid heart rate  Gastrointestinal ROS: negative for - abdominal pain, blood in stools, change in bowel habits, constipation, diarrhea, gas/bloating, heartburn or nausea/vomiting  Musculoskeletal ROS: negative for - joint pain, joint stiffness, joint swelling or muscle, back pain, bowel or bladder incontinence  Neurological ROS: negative for - behavioral changes, confusion, dizziness, headaches, memory loss, numbness/tingling, seizures or speech problems, weakness  Dermatological ROS: negative for - dry skin, mole changes, nail changes, pruritus, rash or skin lesion changes    Prior to Visit Medications    Medication Sig Taking?  Authorizing Provider   Multiple Vitamins-Minerals (ONE-A-DAY WOMENS PO) Take by mouth Yes Historical Provider, MD   atenolol (TENORMIN) 25 MG tablet TAKE 1 TABLET BY MOUTH EVERY DAY ALONG WITH 50MG TO EQUAL 75MG TOTAL DOSE Yes Princess Shaylee MD   atenolol LESION DESTRUCTION      TUBAL LIGATION      WISDOM TOOTH EXTRACTION     ,   Social History     Tobacco Use    Smoking status: Current Every Day Smoker     Packs/day: 0.50     Types: Cigarettes    Smokeless tobacco: Never Used   Substance Use Topics    Alcohol use: Yes     Comment: 2 glasses of wine per day    Drug use: No   ,   Family History   Problem Relation Age of Onset    Diabetes Mother     Heart Disease Mother     Obesity Mother     Parkinsonism Father     Anxiety Disorder Father     Depression Father     Other Sister         RA    Cancer Maternal Grandmother         breast    No Known Problems Maternal Grandfather     No Known Problems Paternal Grandmother     No Known Problems Paternal Grandfather     Cancer Sister         melanoma    Kidney Disease Sister         kidney stones   ,   There is no immunization history on file for this patient.,   Health Maintenance   Topic Date Due    Hepatitis C screen  Never done    Pneumococcal 0-64 years Vaccine (1 of 2 - PPSV23) Never done    COVID-19 Vaccine (1) Never done    HIV screen  Never done    DTaP/Tdap/Td vaccine (1 - Tdap) Never done    Cervical cancer screen  Never done    Shingles Vaccine (1 of 2) Never done    Colon cancer screen colonoscopy  Never done    Breast cancer screen  01/30/2019    Annual Wellness Visit (AWV)  Never done    Flu vaccine (Season Ended) 09/01/2021    Lipid screen  03/08/2022    Hepatitis A vaccine  Aged Out    Hepatitis B vaccine  Aged Out    Hib vaccine  Aged Out    Meningococcal (ACWY) vaccine  Aged Out       PHYSICAL EXAMINATION:  [ INSTRUCTIONS:  \"[x]\" Indicates a positive item  \"[]\" Indicates a negative item  -- DELETE ALL ITEMS NOT EXAMINED]  Vital Signs: (As obtained by patient/caregiver or practitioner observation)    Blood pressure-  Heart rate-    Respiratory rate-    Temperature-  Pulse oximetry-     Constitutional: [x] Appears well-developed and well-nourished [] No apparent distress [x] Abnormal-   Mental status  [x] Alert and awake  [x] Oriented to person/place/time [x]Able to follow commands      Eyes:  EOM    [x]  Normal  [] Abnormal-  Sclera  [x]  Normal  [] Abnormal -         Discharge [x]  None visible  [] Abnormal -    HENT:   [x] Normocephalic, atraumatic. [] Abnormal   [x] Mouth/Throat: Mucous membranes are moist.     External Ears [x] Normal  [] Abnormal-     Neck: [x] No visualized mass     Pulmonary/Chest: [x] Respiratory effort normal.  [x] No visualized signs of difficulty breathing or respiratory distress        [] Abnormal-      Musculoskeletal:   [] Normal gait with no signs of ataxia         [x] Normal range of motion of neck        [] Abnormal-       Neurological:        [x] No Facial Asymmetry (Cranial nerve 7 motor function) (limited exam to video visit)          [] No gaze palsy        [] Abnormal-         Skin:        [x] No significant exanthematous lesions or discoloration noted on facial skin         [] Abnormal-            Psychiatric:       [x] Normal Affect [x] No Hallucinations        [] Abnormal-       Other pertinent observable physical exam findings-     Due to this being a TeleHealth encounter, evaluation of the following organ systems is limited: Vitals/Constitutional/EENT/Resp/CV/GI//MS/Neuro/Skin/Heme-Lymph-Imm. ASSESSMENT/PLAN:  Jose Carlos Morgan was seen today for hand problem. Diagnoses and all orders for this visit:    Right wrist pain  -     External Referral To Orthopedic Surgery      No follow-ups on file. An  electronic signature was used to authenticate this note.     --Sushil Mckeon MD on 5/19/2021 at 3:47 }    Pursuant to the emergency declaration under the Former[facility], [facility] waiver authority and the [employer] and [employer] Act, this Virtual  Visit was conducted, with patient's consent, to reduce the patient's risk of exposure to COVID-19 and provide continuity of care for an established patient. Services were provided through a video synchronous discussion virtually to substitute for in-person clinic visit.

## 2021-05-19 NOTE — LETTER
[facility]  [address] [facility] [address]  Phone: [phone]  Fax: [phone]    Mele Suarez MD        May 19, 2021     Patient: Alexandre Pierce   YOB: 1970   Date of Visit: 5/19/2021       To Whom It May Concern: It is my medical opinion that Teresa Mckeon is having issue with her right hand, may take her longer than normal to use a computer. I have referred her to orthopedics for this issue. If you have any questions or concerns, please don't hesitate to call.     Sincerely,        Mele Suarez MD

## 2021-07-14 ENCOUNTER — VIRTUAL VISIT (OUTPATIENT)
Dept: FAMILY MEDICINE CLINIC | Age: 51
End: 2021-07-14
Payer: MEDICARE

## 2021-07-14 ENCOUNTER — TELEPHONE (OUTPATIENT)
Dept: FAMILY MEDICINE CLINIC | Age: 51
End: 2021-07-14

## 2021-07-14 DIAGNOSIS — L72.3 SEBACEOUS CYST OF AXILLA: ICD-10-CM

## 2021-07-14 DIAGNOSIS — J06.9 ACUTE UPPER RESPIRATORY INFECTION: Primary | ICD-10-CM

## 2021-07-14 PROCEDURE — 99214 OFFICE O/P EST MOD 30 MIN: CPT | Performed by: FAMILY MEDICINE

## 2021-07-14 RX ORDER — ATENOLOL 50 MG/1
50 TABLET ORAL DAILY
Qty: 30 TABLET | Refills: 11 | Status: SHIPPED
Start: 2021-07-14 | End: 2022-08-09 | Stop reason: CLARIF

## 2021-07-14 RX ORDER — ATENOLOL 25 MG/1
TABLET ORAL
Qty: 30 TABLET | Refills: 11 | Status: SHIPPED
Start: 2021-07-14 | End: 2022-08-09 | Stop reason: CLARIF

## 2021-07-14 RX ORDER — BUPROPION HYDROCHLORIDE 150 MG/1
150 TABLET ORAL EVERY MORNING
Qty: 30 TABLET | Refills: 3 | Status: SHIPPED | OUTPATIENT
Start: 2021-07-14

## 2021-07-14 NOTE — TELEPHONE ENCOUNTER
Patient called and needs the work letter sent to [employer], but included has to have excuse for Sunday and Monday that she called off due to being sick

## 2021-07-14 NOTE — PROGRESS NOTES
TeleMedicine Patient Consent    This visit was performed as a virtual video visit using a synchronous, two-way, audio-video telehealth technology platform. Patient identification was verified at the start of the visit, including the patient's telephone number and physical location. I discussed with the patient the nature of our telehealth visits, that:     Due to the nature of an audio- video modality, the only components of a physical exam that could be done are the elements supported by direct observation. I would evaluate the patient and recommend diagnostics and treatments based on my assessment. If it was felt that the patient should be evaluated in clinic or an emergency room setting, then they would be directed there. Our sessions are not being recorded and that personal health information is protected. Our team would provide follow up care in person if/when the patient needs it. Patient does agree to proceed with telemedicine consultation. Patient's location: home address in [place]. Is there anyone else present for this visit: No  This visit was completed virtually using [employer]. me    Physician Location:   [place] [address]    Time spent: Greater than Not billed by time    2021    TELEHEALTH EVALUATION -- Audio or Visual (During IMXXC-62 public health emergency)    HPI: Pt c/o headache, sinus congestion, drainage for 4 days. She had teeth pain but is better today. She c/o cyst that broke open and drainged. It is  No longer painful. She is not having any cough or fever.      Mahsa Nguyen (:  1970) has requested an audio/video evaluation for the following concern(s):        ROS Unless otherwise specified  Review of Systems - General ROS: negative for - chills, fatigue, fever, night sweats, sleep disturbance, weight gain or weight loss  Psychological ROS: negative for - anxiety, behavioral disorder, depression, hallucinations, irritability, memory difficulties, mood swings, sleep disturbances or suicidal ideation  ENT ROS: negative for - epistaxis, headaches, hearing change, nasal congestion, nasal discharge, nasal polyps, sinus pain, tinnitus, vertigo or visual changes  Hematological and Lymphatic ROS: negative for - bleeding problems, blood clots, fatigue or swollen lymph nodes  Respiratory ROS: negative for - cough, orthopnea, shortness of breath, sputum changes, tachypnea or wheezing  Cardiovascular ROS: negative for - chest pain, dyspnea on exertion, irregular heartbeat, loss of consciousness, palpitations, paroxysmal nocturnal dyspnea or rapid heart rate  Gastrointestinal ROS: negative for - abdominal pain, blood in stools, change in bowel habits, constipation, diarrhea, gas/bloating, heartburn or nausea/vomiting  Musculoskeletal ROS: negative for - joint pain, joint stiffness, joint swelling or muscle, back pain, bowel or bladder incontinence  Neurological ROS: negative for - behavioral changes, confusion, dizziness, headaches, memory loss, numbness/tingling, seizures or speech problems, weakness  Dermatological ROS: negative for - dry skin, mole changes, nail changes, pruritus, rash or skin lesion changes    Prior to Visit Medications    Medication Sig Taking?  Authorizing Provider   buPROPion (WELLBUTRIN XL) 150 MG extended release tablet Take 1 tablet by mouth every morning Yes Dianne Santa MD   atenolol (TENORMIN) 50 MG tablet Take 1 tablet by mouth daily Yes Dianne Santa MD   atenolol (TENORMIN) 25 MG tablet TAKE 1 TABLET BY MOUTH EVERY DAY ALONG WITH 50MG TO EQUAL 75MG TOTAL DOSE Yes Dianne Santa MD   Multiple Vitamins-Minerals (ONE-A-DAY WOMENS PO) Take by mouth Yes Historical Provider, MD   OIL OF [place] PO Take 1 drop by mouth daily Yes Historical Provider, MD   Coenzyme Q10 (COQ10 PO) Take by mouth daily Yes Historical Provider, MD   Omega-3 Fatty Acids (OMEGA-3 FISH OIL PO) Take by mouth daily Yes Historical Provider, MD   TURMERIC CURCUMIN PO Take by mouth daily  Yes Historical Provider, MD   Cholecalciferol (VITAMIN D3) 5000 UNITS TABS Take by mouth daily  Yes Historical Provider, MD   magnesium oxide (MAG-OX) 400 MG tablet Take 400 mg by mouth daily  Patient not taking: Reported on 7/14/2021  Historical Provider, MD   fluticasone (FLONASE) 50 MCG/ACT nasal spray 2 sprays by Nasal route daily 2 sprays each nostril once a day  Patient not taking: Reported on 7/14/2021  Rosita Bowles DO   Pediatric Multivit-Minerals-C (CHILDRENS MULTIVITAMIN PO) Take by mouth daily   Patient not taking: Reported on 7/14/2021  Historical Provider, MD       Social History     Tobacco Use    Smoking status: Current Some Day Smoker     Packs/day: 0.50     Types: Cigarettes    Smokeless tobacco: Never Used    Tobacco comment: Pt declinedc to talk about smoking.    Substance Use Topics    Alcohol use: Yes     Comment: 2 glasses of wine per day    Drug use: No        Allergies   Allergen Reactions    Decongestant [Pseudoephedrine Hcl] Palpitations     Severe pounding in ears and patient passed out     Ceclor [Cefaclor]     Clindamycin/Lincomycin      Can't remember reaction    Penicillins Other (See Comments)     Pt not sure      Prednisone Other (See Comments)     yelling    Prochlorperazine Edisylate Swelling     (Compazine)    Sulfa Antibiotics    ,   Past Medical History:   Diagnosis Date    Allergic rhinitis     Anxiety     Cancer (HCC)     cervical    Chronic back pain     Depression     Hypertension     Mitral valve prolapse     Osteoarthritis     Substance abuse (HCC)     alcohol in the past   ,   Past Surgical History:   Procedure Laterality Date    CERVIX LESION DESTRUCTION      TUBAL LIGATION      WISDOM TOOTH EXTRACTION     ,   Social History     Tobacco Use    Smoking status: Current Some Day Smoker     Packs/day: 0.50     Types: Cigarettes    Smokeless tobacco: Never Used    Tobacco comment: Pt declinedc to talk about smoking.    Substance Use Topics    Alcohol use: Yes     Comment: 2 glasses of wine per day    Drug use: No   ,   Family History   Problem Relation Age of Onset    Diabetes Mother     Heart Disease Mother     Obesity Mother     Parkinsonism Father     Anxiety Disorder Father     Depression Father     Other Sister         RA    Cancer Maternal Grandmother         breast    No Known Problems Maternal Grandfather     No Known Problems Paternal Grandmother     No Known Problems Paternal Grandfather     Cancer Sister         melanoma    Kidney Disease Sister         kidney stones   ,   There is no immunization history on file for this patient.,   Health Maintenance   Topic Date Due    Hepatitis C screen  Never done    Pneumococcal 0-64 years Vaccine (1 of 2 - PPSV23) Never done    COVID-19 Vaccine (1) Never done    HIV screen  Never done    DTaP/Tdap/Td vaccine (1 - Tdap) Never done    Cervical cancer screen  Never done    Colon cancer screen colonoscopy  Never done    Shingles Vaccine (1 of 2) Never done    Breast cancer screen  01/30/2019    Annual Wellness Visit (AWV)  Never done    Flu vaccine (1) 09/01/2021    Lipid screen  03/08/2022    Hepatitis A vaccine  Aged Out    Hepatitis B vaccine  Aged Out    Hib vaccine  Aged Out    Meningococcal (ACWY) vaccine  Aged Out       PHYSICAL EXAMINATION:  [ INSTRUCTIONS:  \"[x]\" Indicates a positive item  \"[]\" Indicates a negative item  -- DELETE ALL ITEMS NOT EXAMINED]  Vital Signs: (As obtained by patient/caregiver or practitioner observation)    Blood pressure-  Heart rate-    Respiratory rate-    Temperature-  Pulse oximetry-     Constitutional: [x] Appears well-developed and well-nourished [] No apparent distress      [x] Abnormal-   Mental status  [x] Alert and awake  [x] Oriented to person/place/time [x]Able to follow commands      Eyes:  EOM    [x]  Normal  [] Abnormal-  Sclera  [x]  Normal  [] Abnormal -         Discharge [x]  None visible  [] Abnormal -    HENT:   [x] Normocephalic, atraumatic. [] Abnormal   [x] Mouth/Throat: Mucous membranes are moist.     External Ears [x] Normal  [] Abnormal-     Neck: [x] No visualized mass     Pulmonary/Chest: [x] Respiratory effort normal.  [x] No visualized signs of difficulty breathing or respiratory distress        [] Abnormal-      Musculoskeletal:   [] Normal gait with no signs of ataxia         [x] Normal range of motion of neck        [] Abnormal-       Neurological:        [x] No Facial Asymmetry (Cranial nerve 7 motor function) (limited exam to video visit)          [] No gaze palsy        [] Abnormal-         Skin:        [x] No significant exanthematous lesions or discoloration noted on facial skin         [] Abnormal-            Psychiatric:       [x] Normal Affect [x] No Hallucinations        [] Abnormal-       Other pertinent observable physical exam findings-     Due to this being a TeleHealth encounter, evaluation of the following organ systems is limited: Vitals/Constitutional/EENT/Resp/CV/GI//MS/Neuro/Skin/Heme-Lymph-Imm. ASSESSMENT/PLAN:  Josefa Tobar was seen today for sinusitis and cyst.    Diagnoses and all orders for this visit:    Acute upper respiratory infection    Sebaceous cyst of axilla    Other orders  -     buPROPion (WELLBUTRIN XL) 150 MG extended release tablet; Take 1 tablet by mouth every morning  -     atenolol (TENORMIN) 50 MG tablet; Take 1 tablet by mouth daily  -     atenolol (TENORMIN) 25 MG tablet; TAKE 1 TABLET BY MOUTH EVERY DAY ALONG WITH 50MG TO EQUAL 75MG TOTAL DOSE        No follow-ups on file. An  electronic signature was used to authenticate this note.     --Ruddy Al MD on 7/14/2021 at 9:26 }    Pursuant to the emergency declaration under the [facility], [address] [place] [address] and the [employer] and [employer] Act, this Virtual  Visit was conducted, with patient's consent, to reduce the patient's risk of exposure to COVID-19 and provide continuity of care for an established patient. Services were provided through a video synchronous discussion virtually to substitute for in-person clinic visit.

## 2021-07-14 NOTE — LETTER
[facility]  [address] [facility] [address] [address]  Phone: [phone]  Fax: [phone]    Yaw Dominguez MD        July 14, 2021     Patient: Jasiel Philip   YOB: 1970   Date of Visit: 7/14/2021       To Whom It May Concern: It is my medical opinion that Helen Ryan should be able to work from home due her disabilities. If you have any questions or concerns, please don't hesitate to call.     Sincerely,        Yaw Dominguez MD

## 2021-11-12 ENCOUNTER — VIRTUAL VISIT (OUTPATIENT)
Dept: FAMILY MEDICINE CLINIC | Age: 51
End: 2021-11-12
Payer: MEDICARE

## 2021-11-12 DIAGNOSIS — Z60.4 ISOLATION (SOCIAL): ICD-10-CM

## 2021-11-12 DIAGNOSIS — F41.9 ANXIETY: ICD-10-CM

## 2021-11-12 DIAGNOSIS — F42.2 MIXED OBSESSIONAL THOUGHTS AND ACTS: Primary | ICD-10-CM

## 2021-11-12 PROCEDURE — 99214 OFFICE O/P EST MOD 30 MIN: CPT | Performed by: FAMILY MEDICINE

## 2021-11-12 SDOH — ECONOMIC STABILITY: FOOD INSECURITY: WITHIN THE PAST 12 MONTHS, THE FOOD YOU BOUGHT JUST DIDN'T LAST AND YOU DIDN'T HAVE MONEY TO GET MORE.: NEVER TRUE

## 2021-11-12 SDOH — SOCIAL STABILITY - SOCIAL INSECURITY: SOCIAL EXCLUSION AND REJECTION: Z60.4

## 2021-11-12 SDOH — ECONOMIC STABILITY: FOOD INSECURITY: WITHIN THE PAST 12 MONTHS, YOU WORRIED THAT YOUR FOOD WOULD RUN OUT BEFORE YOU GOT MONEY TO BUY MORE.: NEVER TRUE

## 2021-11-12 ASSESSMENT — SOCIAL DETERMINANTS OF HEALTH (SDOH): HOW HARD IS IT FOR YOU TO PAY FOR THE VERY BASICS LIKE FOOD, HOUSING, MEDICAL CARE, AND HEATING?: NOT HARD AT ALL

## 2021-11-12 NOTE — PROGRESS NOTES
TeleMedicine Patient Consent    This visit was performed as a virtual video visit using a synchronous, two-way, audio-video telehealth technology platform. Patient identification was verified at the start of the visit, including the patient's telephone number and physical location. I discussed with the patient the nature of our telehealth visits, that:     Due to the nature of an audio- video modality, the only components of a physical exam that could be done are the elements supported by direct observation. I would evaluate the patient and recommend diagnostics and treatments based on my assessment. If it was felt that the patient should be evaluated in clinic or an emergency room setting, then they would be directed there. Our sessions are not being recorded and that personal health information is protected. Our team would provide follow up care in person if/when the patient needs it. Patient does agree to proceed with telemedicine consultation. Patient's location: home address in [address] [place] [address] DrAndre Is there anyone else present for this visit: No  This visit was completed virtually using [email or web address]    Physician Location:   [place] [address]    Time spent: Greater than Not billed by time    2021    TELEHEALTH EVALUATION -- Audio or Visual (During QQYET-72 public health emergency)    HPI: Pt is paralyzed by fear of covid and vaccine side effects. She won't leave her house very often. She is isolated and having a very hard time. She has psych appt next week. She is afraid to go inpatient due to covid anxiety. She knows she needs it but cannot do it. She is not suicidal but OCD is very out of control.      Ailin Canales (:  1970) has requested an audio/video evaluation for the following concern(s):        ROS Unless otherwise specified  Review of Systems - General ROS: negative for - chills, fatigue, fever, night sweats, sleep disturbance, weight gain or weight loss  Psychological ROS: negative for - anxiety, behavioral disorder, depression, hallucinations, irritability, memory difficulties, mood swings, sleep disturbances or suicidal ideation  ENT ROS: negative for - epistaxis, headaches, hearing change, nasal congestion, nasal discharge, nasal polyps, sinus pain, tinnitus, vertigo or visual changes  Hematological and Lymphatic ROS: negative for - bleeding problems, blood clots, fatigue or swollen lymph nodes  Respiratory ROS: negative for - cough, orthopnea, shortness of breath, sputum changes, tachypnea or wheezing  Cardiovascular ROS: negative for - chest pain, dyspnea on exertion, irregular heartbeat, loss of consciousness, palpitations, paroxysmal nocturnal dyspnea or rapid heart rate  Gastrointestinal ROS: negative for - abdominal pain, blood in stools, change in bowel habits, constipation, diarrhea, gas/bloating, heartburn or nausea/vomiting  Musculoskeletal ROS: negative for - joint pain, joint stiffness, joint swelling or muscle, back pain, bowel or bladder incontinence  Neurological ROS: negative for - behavioral changes, confusion, dizziness, headaches, memory loss, numbness/tingling, seizures or speech problems, weakness  Dermatological ROS: negative for - dry skin, mole changes, nail changes, pruritus, rash or skin lesion changes    Prior to Visit Medications    Medication Sig Taking?  Authorizing Provider   atenolol (TENORMIN) 50 MG tablet Take 1 tablet by mouth daily Yes Pinky Mcmahon MD   atenolol (TENORMIN) 25 MG tablet TAKE 1 TABLET BY MOUTH EVERY DAY ALONG WITH 50MG TO EQUAL 75MG TOTAL DOSE Yes Pinky Mcmahon MD   Multiple Vitamins-Minerals (ONE-A-DAY WOMENS PO) Take by mouth Yes Historical Provider, MD   OIL OF OREGANO PO Take 1 drop by mouth daily Yes Historical Provider, MD   Coenzyme Q10 (COQ10 PO) Take by mouth daily Yes Historical Provider, MD   Omega-3 Fatty Acids (OMEGA-3 FISH OIL PO) Take by mouth daily Yes Historical Provider, MD   TURMERIC CURCUMIN PO Take by mouth daily  Yes Historical Provider, MD   Cholecalciferol (VITAMIN D3) 5000 UNITS TABS Take by mouth daily  Yes Historical Provider, MD   buPROPion (WELLBUTRIN XL) 150 MG extended release tablet Take 1 tablet by mouth every morning  Patient not taking: Reported on 11/12/2021  Boy Townsend MD   magnesium oxide (MAG-OX) 400 MG tablet Take 400 mg by mouth daily  Patient not taking: Reported on 7/14/2021  Historical Provider, MD   fluticasone (FLONASE) 50 MCG/ACT nasal spray 2 sprays by Nasal route daily 2 sprays each nostril once a day  Patient not taking: Reported on 7/14/2021  Ross Peters DO   Pediatric Multivit-Minerals-C (CHILDRENS MULTIVITAMIN PO) Take by mouth daily   Patient not taking: Reported on 7/14/2021  Historical Provider, MD       Social History     Tobacco Use    Smoking status: Current Some Day Smoker     Packs/day: 0.50     Types: Cigarettes    Smokeless tobacco: Never Used    Tobacco comment: Pt declinedc to talk about smoking.    Substance Use Topics    Alcohol use: Yes     Comment: 2 glasses of wine per day    Drug use: No        Allergies   Allergen Reactions    Decongestant [Pseudoephedrine Hcl] Palpitations     Severe pounding in ears and patient passed out     Ceclor [Cefaclor]     Clindamycin/Lincomycin      Can't remember reaction    Penicillins Other (See Comments)     Pt not sure      Prednisone Other (See Comments)     yelling    Prochlorperazine Edisylate Swelling     (Compazine)    Sulfa Antibiotics    ,   Past Medical History:   Diagnosis Date    Allergic rhinitis     Anxiety     Cancer (HCC)     cervical    Chronic back pain     Depression     Hypertension     Mitral valve prolapse     Osteoarthritis     Substance abuse (HCC)     alcohol in the past   ,   Past Surgical History:   Procedure Laterality Date    CERVIX LESION DESTRUCTION      TUBAL LIGATION      WISDOM TOOTH EXTRACTION     ,   Social History Tobacco Use    Smoking status: Current Some Day Smoker     Packs/day: 0.50     Types: Cigarettes    Smokeless tobacco: Never Used    Tobacco comment: Pt declinedc to talk about smoking.    Substance Use Topics    Alcohol use: Yes     Comment: 2 glasses of wine per day    Drug use: No   ,   Family History   Problem Relation Age of Onset    Diabetes Mother     Heart Disease Mother     Obesity Mother     Parkinsonism Father     Anxiety Disorder Father     Depression Father     Other Sister         RA    Cancer Maternal Grandmother         breast    No Known Problems Maternal Grandfather     No Known Problems Paternal Grandmother     No Known Problems Paternal Grandfather     Cancer Sister         melanoma    Kidney Disease Sister         kidney stones   ,   There is no immunization history on file for this patient.,   Health Maintenance   Topic Date Due    Hepatitis C screen  Never done    Pneumococcal 0-64 years Vaccine (1 of 2 - PPSV23) Never done    COVID-19 Vaccine (1) Never done    HIV screen  Never done    DTaP/Tdap/Td vaccine (1 - Tdap) Never done    Cervical cancer screen  Never done    Colon cancer screen colonoscopy  Never done    Shingles Vaccine (1 of 2) Never done    Breast cancer screen  01/30/2019    Annual Wellness Visit (AWV)  Never done    Flu vaccine (1) Never done    Lipid screen  03/08/2022    Hepatitis A vaccine  Aged Out    Hepatitis B vaccine  Aged Out    Hib vaccine  Aged Out    Meningococcal (ACWY) vaccine  Aged Out       PHYSICAL EXAMINATION:  [ INSTRUCTIONS:  \"[x]\" Indicates a positive item  \"[]\" Indicates a negative item  -- DELETE ALL ITEMS NOT EXAMINED]  Vital Signs: (As obtained by patient/caregiver or practitioner observation)    Blood pressure-  Heart rate-    Respiratory rate-    Temperature-  Pulse oximetry-     Constitutional: [x] Appears well-developed and well-nourished [] No apparent distress      [x] Abnormal-   Mental status  [x] Alert and awake  [x] Oriented to person/place/time [x]Able to follow commands      Eyes:  EOM    [x]  Normal  [] Abnormal-  Sclera  [x]  Normal  [] Abnormal -         Discharge [x]  None visible  [] Abnormal -    HENT:   [x] Normocephalic, atraumatic. [] Abnormal   [x] Mouth/Throat: Mucous membranes are moist.     External Ears [x] Normal  [] Abnormal-     Neck: [x] No visualized mass     Pulmonary/Chest: [x] Respiratory effort normal.  [x] No visualized signs of difficulty breathing or respiratory distress        [] Abnormal-      Musculoskeletal:   [] Normal gait with no signs of ataxia         [x] Normal range of motion of neck        [] Abnormal-       Neurological:        [x] No Facial Asymmetry (Cranial nerve 7 motor function) (limited exam to video visit)          [] No gaze palsy        [] Abnormal-         Skin:        [x] No significant exanthematous lesions or discoloration noted on facial skin         [] Abnormal-            Psychiatric:       [x] Normal Affect [x] No Hallucinations        [] Abnormal-       Other pertinent observable physical exam findings-     Due to this being a TeleHealth encounter, evaluation of the following organ systems is limited: Vitals/Constitutional/EENT/Resp/CV/GI//MS/Neuro/Skin/Heme-Lymph-Imm. ASSESSMENT/PLAN:  Rosa Guo was seen today for discuss medications and anxiety. Diagnoses and all orders for this visit:    Mixed obsessional thoughts and acts  -     Estela Epperson LISW, [facility], [facility] (social)        No follow-ups on file. An  electronic signature was used to authenticate this note.     --Kashmir Lugo MD on 11/14/2021 at 7:53 }    Pursuant to the emergency declaration under the [facility] waiver authority and the [employer] Act, this Virtual  Visit was conducted, with patient's consent, to reduce the patient's risk of exposure to COVID-19 and provide continuity of care for an established patient. Services were provided through a video synchronous discussion virtually to substitute for in-person clinic visit.

## 2021-11-15 ENCOUNTER — NURSE TRIAGE (OUTPATIENT)
Dept: OTHER | Facility: CLINIC | Age: 51
End: 2021-11-15

## 2021-11-15 ENCOUNTER — TELEPHONE (OUTPATIENT)
Dept: FAMILY MEDICINE CLINIC | Age: 51
End: 2021-11-15
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
## 2021-11-15 NOTE — TELEPHONE ENCOUNTER
Received call from Jay Banuelos at [facility] with [employer]. Brief description of triage: N/A, caller disconnected prior to transfer from [facility] ([facility]), this RN attempted to call patient back, message left for patient to return a call to the office. Attention Provider: Thank you for allowing me to participate in the care of your patient. The patient was connected to triage in response to information provided to the ECC/PSC. Please do not respond through this encounter as the response is not directed to a shared pool. Reason for Disposition   Message left on unidentified voice mail. Phone number verified.     Protocols used: NO CONTACT OR DUPLICATE CONTACT CALL-ADULT-[place]

## 2021-11-16 ENCOUNTER — TELEPHONE (OUTPATIENT)
Dept: FAMILY MEDICINE CLINIC | Age: 51
End: 2021-11-16

## 2021-11-16 NOTE — TELEPHONE ENCOUNTER
[facility] received referral in workque for a diagnosis of mixed obsessional  thoughts and acts. Call placed to pt today for brief assessment of need. Pt receptive to speaking with [facility] and prior chart notes reviewed. Pt states that she was previously in counseling at [facility] but did not have a good experience with her counselor. She states that she is \"kind of burned out on talking at this point. \"  Pt states that she still follows with psychiatry but is not sure if she wants to follow with counseling at this point. Discussed centers which may provide faster availability due to size ([facility], [facility], [address]) when she is ready to return to counseling as pt is concerned about wait times. Pt mildly receptive but declined contact information today. Supportive listening provided and encouraged her to contact [facility] should she decide she would like to pursue. Pt agreeable and appreciative of call. Naldo Sutton, MSW, LISW-S, OSW-C

## 2021-11-19 ENCOUNTER — TELEPHONE (OUTPATIENT)
Dept: FAMILY MEDICINE CLINIC | Age: 51
End: 2021-11-19

## 2021-11-19 NOTE — TELEPHONE ENCOUNTER
Neosporin once a day. Keep it clean and dry. Do not use peroxide to clean it as it will inhibit the healing at this point.
Pt cant have Silvadene. She did say that the burn seems to be doing okay. Nothing alarming, shes keeping it clean, she is wrapping it with guaze. It is a little red w very little discharge. She stated it seems clean with no infection. Should she put neosporin on it? Should she keep doing what shes doing?
Pt notified, expressed understanding
She has sulfa listed as an allergy and Silvadene has sulfa in it. What is her reaction to it?
Scheduling)?  Yes

## 2022-08-09 ENCOUNTER — OFFICE VISIT (OUTPATIENT)
Dept: CARDIOLOGY CLINIC | Age: 52
End: 2022-08-09
Payer: MEDICARE

## 2022-08-09 VITALS
BODY MASS INDEX: 33.1 KG/M2 | WEIGHT: 186.8 LBS | DIASTOLIC BLOOD PRESSURE: 86 MMHG | HEIGHT: 63 IN | RESPIRATION RATE: 14 BRPM | SYSTOLIC BLOOD PRESSURE: 140 MMHG | HEART RATE: 70 BPM

## 2022-08-09 DIAGNOSIS — I34.1 MVP (MITRAL VALVE PROLAPSE): ICD-10-CM

## 2022-08-09 DIAGNOSIS — R00.2 PALPITATIONS: Primary | ICD-10-CM

## 2022-08-09 DIAGNOSIS — I10 PRIMARY HYPERTENSION: ICD-10-CM

## 2022-08-09 PROCEDURE — 93000 ELECTROCARDIOGRAM COMPLETE: CPT | Performed by: INTERNAL MEDICINE

## 2022-08-09 PROCEDURE — 99204 OFFICE O/P NEW MOD 45 MIN: CPT | Performed by: INTERNAL MEDICINE

## 2022-08-09 RX ORDER — ATENOLOL 50 MG/1
50 TABLET ORAL DAILY
COMMUNITY
End: 2022-08-09 | Stop reason: SDUPTHER

## 2022-08-09 RX ORDER — ATENOLOL 50 MG/1
50 TABLET ORAL DAILY
Qty: 135 TABLET | Refills: 3 | Status: SHIPPED | OUTPATIENT
Start: 2022-08-09

## 2022-08-09 NOTE — PROGRESS NOTES
Patient Active Problem List   Diagnosis    Lightheadedness    Asthma    Chronic low back pain    HTN (hypertension)       Current Outpatient Medications   Medication Sig Dispense Refill    atenolol (TENORMIN) 50 MG tablet Take 1 tablet by mouth in the morning. 1 1/2 pills daily. 135 tablet 3    Multiple Vitamins-Minerals (ONE-A-DAY WOMENS PO) Take by mouth      OIL OF OREGANO PO Take 1 drop by mouth daily      Coenzyme Q10 (COQ10 PO) Take by mouth daily      Omega-3 Fatty Acids (OMEGA-3 FISH OIL PO) Take by mouth daily      TURMERIC CURCUMIN PO Take by mouth daily       Cholecalciferol (VITAMIN D3) 5000 UNITS TABS Take by mouth daily       buPROPion (WELLBUTRIN XL) 150 MG extended release tablet Take 1 tablet by mouth every morning (Patient not taking: No sig reported) 30 tablet 3    magnesium oxide (MAG-OX) 400 MG tablet Take 400 mg by mouth daily (Patient not taking: Reported on 8/9/2022)      fluticasone (FLONASE) 50 MCG/ACT nasal spray 2 sprays by Nasal route daily 2 sprays each nostril once a day (Patient not taking: Reported on 7/14/2021) 1 Bottle 3    Pediatric Multivit-Minerals-C (CHILDRENS MULTIVITAMIN PO) Take by mouth daily  (Patient not taking: No sig reported)       No current facility-administered medications for this visit. CC:    Patient is seen in Initial Evaluation for:  1. Palpitations    2. Primary hypertension    3. MVP (mitral valve prolapse)        HPI:  Patient is seen in evaluation for multiple issues. She does have a long history of palpitations and has been on atenolol for over 10 years for control of this problem. She notes difficulties when she stands up from a sitting position with lightheadedness that takes a while to resolve. She has been unsuccessful in attempting to wean down her atenolol dose. Also, patient is concerned about her mitral valve prolapse that was diagnosed years ago. She has not had any follow-up in 5 years. She denies any unusual shortness of breath.   She is also attempting to quit smoking due to the effects on her dental status. She is also attempting to decrease the 2 glasses of wine she drinks at night to help her sleep. She has been under a lot of emotional stress. ROS:   General: No unusual weight gain, no change in exercise tolerance  Skin: No rash or itching  EENT: No vision changes or nosebleeds  Cardiovascular: No orthopnea or paroxysmal nocturnal dyspnea  Respiratory: No cough or hemoptysis  Gastrointestinal: No hematemesis or recent changes in bowel habits  Genitourinary: No hematuria, urgency or frequency  Musculoskeletal: No muscular weakness or joint swelling   Neurologic / Psychiatric: No incoordination or convulsions  Allergic / Immunologic/ Lymphatic / Endocrine: No anemia or bleeding tendency    Social History     Socioeconomic History    Marital status:      Spouse name: Not on file    Number of children: Not on file    Years of education: Not on file    Highest education level: Not on file   Occupational History    Not on file   Tobacco Use    Smoking status: Some Days     Packs/day: 0.50     Types: Cigarettes     Passive exposure: Current    Smokeless tobacco: Never    Tobacco comments:     Pt declinedc to talk about smoking.    Vaping Use    Vaping Use: Never used   Substance and Sexual Activity    Alcohol use: Yes     Comment: 2 glasses of wine per day    Drug use: No    Sexual activity: Not on file   Other Topics Concern    Not on file   Social History Narrative    Not on file     Social Determinants of Health     Financial Resource Strain: Low Risk     Difficulty of Paying Living Expenses: Not hard at all   Food Insecurity: No Food Insecurity    Worried About Running Out of Food in the Last Year: Never true    Ran Out of Food in the Last Year: Never true   Transportation Needs: Not on file   Physical Activity: Not on file   Stress: Not on file   Social Connections: Not on file   Intimate Partner Violence: Not on file   Housing Stability: Not on file       Family History   Problem Relation Age of Onset    Diabetes Mother     Heart Disease Mother     Obesity Mother     Parkinsonism Father     Anxiety Disorder Father     Depression Father     Other Sister         RA    Cancer Maternal Grandmother         breast    No Known Problems Maternal Grandfather     No Known Problems Paternal Grandmother     No Known Problems Paternal Grandfather     Cancer Sister         melanoma    Kidney Disease Sister         kidney stones       Past Medical History:   Diagnosis Date    Allergic rhinitis     Anxiety     Cancer ([facility] [address]     cervical    Chronic back pain     Depression     Hypertension     Mitral valve prolapse     Osteoarthritis     Substance abuse ([facility] [address]     alcohol in the past       PHYSICAL EXAM:  CONSTITUTIONAL:  Well developed, well nourished    Vitals:    08/09/22 1447   BP: (!) 140/86   Pulse: 70   Resp: 14   Weight: 186 lb 12.8 oz (84.7 kg)   Height: 5' 3\" (1.6 m)     HEAD & FACE: Normocephalic. Symmetric. EYES: No xanthelasma. Conjunctivae not injected. EARS, NOSE, MOUTH & THROAT: Good dentition. No oral pallor or cyanosis. NECK: No JVD at 30 degrees. No thyromegaly. RESPIRATORY: Clear to auscultation and percussion in all fields. No use of accessory muscle or intercostal retractions. CARDIOVASCULAR: Regular rate and rhythm. No lifts or thrills on palpitation. Auscultation with normal S1-S2 in intensity and splitting. No carotid bruits. Abdominal aorta not enlarged. Femoral arteries without bruits. Pedal pulses 2+. No edema. ABDOMEN: Soft without hepatic or splenic enlargement. No tenderness. MUSCULOSKELETAL: No kyphosis or scoliosis of the back. Good muscle strength and tone. No muscle atrophy. Normal gait and ability to undergo exercise stress testing. EXTREMITIES: No clubbing or cyanosis. SKIN: No Xanthomas or ulcerations. NEUROLOGIC: Oriented to time, place and person. Normal mood and affect. LYMPHATIC:  No palpable neck or supraclavicular nodes. No splenomegaly. EKG: the EKG tracing was reviewed and found to reveal: Normal sinus rhythm.  ms. .      ASSESSMENT:                                                     ORDERS:       Diagnosis Orders   1. Palpitations  EKG 12 lead      2. Primary hypertension  EKG 12 lead      3. MVP (mitral valve prolapse)  ECHO COMPLETE        Above assessment cardiac issues stable/probable beta-blocker side effects. PLAN:   See above orders. Medication reconciliation completed. Old records were reviewed and found to reveal:   Discussed with patient that she should probably hold off weaning off her atenolol while she is attempting to quit smoking and alcohol. Discussed issues that would prompt earlier evaluation. Same cardiac medications. Follow-up office visit in 3 months.

## 2022-08-22 ENCOUNTER — HOSPITAL ENCOUNTER (EMERGENCY)
Age: 52
Discharge: LEFT BEFORE DISCHARGE | End: 2022-08-22

## 2022-08-22 VITALS
BODY MASS INDEX: 29.88 KG/M2 | HEIGHT: 64 IN | OXYGEN SATURATION: 99 % | TEMPERATURE: 96.5 F | RESPIRATION RATE: 14 BRPM | HEART RATE: 75 BPM | SYSTOLIC BLOOD PRESSURE: 117 MMHG | WEIGHT: 175 LBS | DIASTOLIC BLOOD PRESSURE: 74 MMHG

## 2022-08-22 ASSESSMENT — PAIN - FUNCTIONAL ASSESSMENT: PAIN_FUNCTIONAL_ASSESSMENT: NONE - DENIES PAIN

## 2022-09-15 ENCOUNTER — TELEPHONE (OUTPATIENT)
Dept: CARDIOLOGY | Age: 52
End: 2022-09-15

## 2023-01-09 ENCOUNTER — TELEPHONE (OUTPATIENT)
Dept: CARDIOLOGY | Age: 53
End: 2023-01-09

## 2023-01-09 NOTE — TELEPHONE ENCOUNTER
PATIENT CALLED TO CANCEL ECHO FOR 01-10-23. WOULD LIKE TO RESCHEDULE ECHO WITH OV WITH DR. Christin Dawson AT A LATER DATE.     Electronically signed by Te Obrien on 1/9/2023 at 9:01 AM

## 2023-01-09 NOTE — TELEPHONE ENCOUNTER
CALLED PATIENT AND LEFT MESSAGE TO SCHEDULE ECHO FOR 02-07-23 AT 8:15 AND OV WITH DR. Berto Briceno FOLLOWING.     Electronically signed by Sanjana Palma on 1/9/2023 at 9:21 AM

## 2023-02-03 ENCOUNTER — TELEPHONE (OUTPATIENT)
Dept: CARDIOLOGY | Age: 53
End: 2023-02-03

## 2023-03-28 ENCOUNTER — OFFICE VISIT (OUTPATIENT)
Dept: CARDIOLOGY CLINIC | Age: 53
End: 2023-03-28
Payer: MEDICARE

## 2023-03-28 ENCOUNTER — HOSPITAL ENCOUNTER (OUTPATIENT)
Dept: CARDIOLOGY | Age: 53
Discharge: HOME | End: 2023-03-28
Payer: MEDICARE

## 2023-03-28 VITALS
DIASTOLIC BLOOD PRESSURE: 80 MMHG | RESPIRATION RATE: 16 BRPM | HEART RATE: 66 BPM | WEIGHT: 188.3 LBS | SYSTOLIC BLOOD PRESSURE: 160 MMHG | HEIGHT: 64 IN | BODY MASS INDEX: 32.15 KG/M2

## 2023-03-28 DIAGNOSIS — I10 PRIMARY HYPERTENSION: ICD-10-CM

## 2023-03-28 DIAGNOSIS — R00.2 PALPITATIONS: Primary | ICD-10-CM

## 2023-03-28 DIAGNOSIS — I34.1 MVP (MITRAL VALVE PROLAPSE): ICD-10-CM

## 2023-03-28 LAB
LV EF: 60 %
LVEF MODALITY: NORMAL

## 2023-03-28 PROCEDURE — 99214 OFFICE O/P EST MOD 30 MIN: CPT | Performed by: INTERNAL MEDICINE

## 2023-03-28 PROCEDURE — 93000 ELECTROCARDIOGRAM COMPLETE: CPT | Performed by: INTERNAL MEDICINE

## 2023-03-28 PROCEDURE — 3077F SYST BP >= 140 MM HG: CPT | Performed by: INTERNAL MEDICINE

## 2023-03-28 PROCEDURE — 93306 TTE W/DOPPLER COMPLETE: CPT | Performed by: PSYCHIATRY & NEUROLOGY

## 2023-03-28 PROCEDURE — 3079F DIAST BP 80-89 MM HG: CPT | Performed by: INTERNAL MEDICINE

## 2023-03-28 RX ORDER — ATENOLOL 25 MG/1
25 TABLET ORAL DAILY
COMMUNITY
End: 2023-03-28 | Stop reason: SDUPTHER

## 2023-03-28 RX ORDER — ATENOLOL 25 MG/1
25 TABLET ORAL DAILY
Qty: 30 TABLET | Refills: 5 | Status: SHIPPED | OUTPATIENT
Start: 2023-03-28

## 2023-03-28 NOTE — PROGRESS NOTES
Patient Active Problem List   Diagnosis    Lightheadedness    Asthma    Chronic low back pain    HTN (hypertension)       Current Outpatient Medications   Medication Sig Dispense Refill    atenolol (TENORMIN) 25 MG tablet Take 1 tablet by mouth daily 30 tablet 5    atenolol (TENORMIN) 50 MG tablet Take 1 tablet by mouth in the morning. 1 1/2 pills daily. 135 tablet 3    Multiple Vitamins-Minerals (ONE-A-DAY WOMENS PO) Take by mouth      OIL OF OREGANO PO Take 1 drop by mouth daily      Coenzyme Q10 (COQ10 PO) Take by mouth daily      TURMERIC CURCUMIN PO Take by mouth daily       Cholecalciferol (VITAMIN D3) 5000 UNITS TABS Take by mouth daily        No current facility-administered medications for this visit. Facility-Administered Medications Ordered in Other Visits   Medication Dose Route Frequency Provider Last Rate Last Admin    perflutren lipid microspheres (DEFINITY) injection 1.5 mL  1.5 mL IntraVENous ONCE PRN Kallie Copeland MD           CC:    Patient is seen in Initial Evaluation for:  1. Palpitations    2. Primary hypertension    3. MVP (mitral valve prolapse)        HPI:  Patient is seen in evaluation for multiple issues. She does have a long history of palpitations and has been on atenolol since 2010 for control of this problem. She notes difficulties when she stands up from a sitting position with lightheadedness that takes a while to resolve. She has been unsuccessful in attempting to wean down her atenolol dose. Also, patient is concerned about her mitral valve prolapse that was diagnosed years ago. She denies any unusual shortness of breath. She has been under a lot of emotional stress.      ROS:   General: No unusual weight gain, no change in exercise tolerance  Skin: No rash or itching  EENT: No vision changes or nosebleeds  Cardiovascular: No orthopnea or paroxysmal nocturnal dyspnea  Respiratory: No cough or hemoptysis  Gastrointestinal: No hematemesis or recent changes in bowel

## 2023-03-29 ENCOUNTER — TELEPHONE (OUTPATIENT)
Dept: CARDIOLOGY CLINIC | Age: 53
End: 2023-03-29

## 2023-03-29 NOTE — TELEPHONE ENCOUNTER
----- Message from Pietro Tobar MD sent at 3/28/2023  5:19 PM EDT -----  Please let patient know that her echocardiogram was unchanged from prior study.

## 2023-04-22 ENCOUNTER — HOSPITAL ENCOUNTER (EMERGENCY)
Age: 53
Discharge: HOME | End: 2023-04-22
Attending: EMERGENCY MEDICINE
Payer: MEDICARE

## 2023-04-22 VITALS
WEIGHT: 170 LBS | SYSTOLIC BLOOD PRESSURE: 142 MMHG | RESPIRATION RATE: 16 BRPM | TEMPERATURE: 98.8 F | OXYGEN SATURATION: 99 % | DIASTOLIC BLOOD PRESSURE: 74 MMHG | BODY MASS INDEX: 29.18 KG/M2 | HEART RATE: 89 BPM

## 2023-04-22 DIAGNOSIS — K08.89 PAIN, DENTAL: ICD-10-CM

## 2023-04-22 DIAGNOSIS — N30.00 ACUTE CYSTITIS WITHOUT HEMATURIA: Primary | ICD-10-CM

## 2023-04-22 LAB
ANION GAP SERPL CALCULATED.3IONS-SCNC: 10 MMOL/L (ref 7–16)
BACTERIA URNS QL MICRO: ABNORMAL /HPF
BASOPHILS # BLD: 0.04 E9/L (ref 0–0.2)
BASOPHILS NFR BLD: 0.4 % (ref 0–2)
BILIRUB UR QL STRIP: NEGATIVE
BUN SERPL-MCNC: 14 MG/DL (ref 6–20)
CALCIUM SERPL-MCNC: 9.5 MG/DL (ref 8.6–10.2)
CHLORIDE SERPL-SCNC: 99 MMOL/L (ref 98–107)
CLARITY UR: CLEAR
CO2 SERPL-SCNC: 25 MMOL/L (ref 22–29)
COLOR UR: YELLOW
CREAT SERPL-MCNC: 0.6 MG/DL (ref 0.5–1)
EOSINOPHIL # BLD: 0.03 E9/L (ref 0.05–0.5)
EOSINOPHIL NFR BLD: 0.3 % (ref 0–6)
ERYTHROCYTE [DISTWIDTH] IN BLOOD BY AUTOMATED COUNT: 12.9 FL (ref 11.5–15)
GLUCOSE SERPL-MCNC: 110 MG/DL (ref 74–99)
GLUCOSE UR STRIP-MCNC: NEGATIVE MG/DL
HCT VFR BLD AUTO: 45 % (ref 34–48)
HGB BLD-MCNC: 15.4 G/DL (ref 11.5–15.5)
HGB UR QL STRIP: ABNORMAL
IMM GRANULOCYTES # BLD: 0.02 E9/L
IMM GRANULOCYTES NFR BLD: 0.2 % (ref 0–5)
KETONES UR STRIP-MCNC: NEGATIVE MG/DL
LACTATE BLDV-SCNC: 1.1 MMOL/L (ref 0.5–2.2)
LEUKOCYTE ESTERASE UR QL STRIP: ABNORMAL
LYMPHOCYTES # BLD: 1.97 E9/L (ref 1.5–4)
LYMPHOCYTES NFR BLD: 18.8 % (ref 20–42)
MCH RBC QN AUTO: 32.4 PG (ref 26–35)
MCHC RBC AUTO-ENTMCNC: 34.2 % (ref 32–34.5)
MCV RBC AUTO: 94.5 FL (ref 80–99.9)
MONOCYTES # BLD: 0.76 E9/L (ref 0.1–0.95)
MONOCYTES NFR BLD: 7.3 % (ref 2–12)
NEUTROPHILS # BLD: 7.64 E9/L (ref 1.8–7.3)
NEUTS SEG NFR BLD: 73 % (ref 43–80)
NITRITE UR QL STRIP: NEGATIVE
PH UR STRIP: 6 [PH] (ref 5–9)
PLATELET # BLD AUTO: 212 E9/L (ref 130–450)
PMV BLD AUTO: 9.3 FL (ref 7–12)
POTASSIUM SERPL-SCNC: 4 MMOL/L (ref 3.5–5)
PROT UR STRIP-MCNC: NEGATIVE MG/DL
RBC # BLD AUTO: 4.76 E12/L (ref 3.5–5.5)
RBC #/AREA URNS HPF: ABNORMAL /HPF (ref 0–2)
SODIUM SERPL-SCNC: 134 MMOL/L (ref 132–146)
SP GR UR STRIP: <=1.005 (ref 1–1.03)
UROBILINOGEN UR STRIP-ACNC: 0.2 E.U./DL
WBC # BLD: 10.5 E9/L (ref 4.5–11.5)
WBC #/AREA URNS HPF: >20 /HPF (ref 0–5)

## 2023-04-22 PROCEDURE — 83605 ASSAY OF LACTIC ACID: CPT

## 2023-04-22 PROCEDURE — 85025 COMPLETE CBC W/AUTO DIFF WBC: CPT

## 2023-04-22 PROCEDURE — 80048 BASIC METABOLIC PNL TOTAL CA: CPT

## 2023-04-22 PROCEDURE — 87186 SC STD MICRODIL/AGAR DIL: CPT

## 2023-04-22 PROCEDURE — 87088 URINE BACTERIA CULTURE: CPT

## 2023-04-22 PROCEDURE — 81001 URINALYSIS AUTO W/SCOPE: CPT

## 2023-04-22 PROCEDURE — 99283 EMERGENCY DEPT VISIT LOW MDM: CPT

## 2023-04-22 RX ORDER — NITROFURANTOIN 25; 75 MG/1; MG/1
100 CAPSULE ORAL 2 TIMES DAILY
Qty: 14 CAPSULE | Refills: 0 | Status: SHIPPED | OUTPATIENT
Start: 2023-04-22 | End: 2023-04-29

## 2023-04-22 ASSESSMENT — PAIN SCALES - GENERAL: PAINLEVEL_OUTOF10: 0

## 2023-04-22 ASSESSMENT — LIFESTYLE VARIABLES
HOW OFTEN DO YOU HAVE A DRINK CONTAINING ALCOHOL: NEVER
HOW MANY STANDARD DRINKS CONTAINING ALCOHOL DO YOU HAVE ON A TYPICAL DAY: PATIENT DOES NOT DRINK

## 2023-04-22 ASSESSMENT — PAIN - FUNCTIONAL ASSESSMENT: PAIN_FUNCTIONAL_ASSESSMENT: 0-10

## 2023-04-22 NOTE — ED PROVIDER NOTES
types, doses of medications or imaging studies ordered here in the ER. Lactic acid levels were ordered to evaluate for signs of ischemia or decrease perfusion to organ systems. Social determinants affecting disposition:   None; patient has PCP and dentist for follow-up              CONSULTS: (Who and What was discussed)  None        I am the Primary Clinician of Record. FINAL IMPRESSION      1. Acute cystitis without hematuria    2.  Pain, dental          DISPOSITION/PLAN     DISPOSITION Decision To Discharge 04/22/2023 06:08:53 PM      PATIENT REFERRED TO:  Maulik Carey MD  [address], [address] [place] [id number]    Call in 2 days  for follow-up appointment    your dentist    Call in 2 days  for follow-up appointment    [facility] [id number] [phone]  Go in 2 days  for follow-up appointment, As needed      DISCHARGE MEDICATIONS:  New Prescriptions    NITROFURANTOIN, MACROCRYSTAL-MONOHYDRATE, (MACROBID) 100 MG CAPSULE    Take 1 capsule by mouth 2 times daily for 7 days       DISCONTINUED MEDICATIONS:  Discontinued Medications    No medications on file              (Please note that portions of this note were completed with a voice recognition program.  Efforts were made to edit the dictations but occasionally words are mis-transcribed.)    Tai You MD (electronically signed)

## 2023-04-22 NOTE — ED NOTES
Discharge instructions given. Patient verbalizes understanding. No other noted or stated problems at this time.  Patient will follow up with primary care and dental.      Clarice Ayala RN  04/22/23 0321

## 2023-04-22 NOTE — DISCHARGE INSTR - COC
I certify the above information and transfer of Jorje Ferreira  is necessary for the continuing treatment of the diagnosis listed and that she requires {Admit to Appropriate Level of Care:70641} for {GREATER/LESS:181319991} 30 days.      Update Admission H&P: {CHP DME Changes in AQLVL:046941284}    PHYSICIAN SIGNATURE:  {Esignature:[id number]}

## 2023-04-24 ENCOUNTER — TELEPHONE (OUTPATIENT)
Dept: FAMILY MEDICINE CLINIC | Age: 53
End: 2023-04-24

## 2023-04-24 NOTE — TELEPHONE ENCOUNTER
----- Message from Miesha Brown sent at 4/24/2023  9:00 AM EDT -----  Subject: Message to Provider    QUESTIONS  Information for Provider? Pt was seen in ED at [facility] on 4/22/23 for UTI and   given an antibiotic. States by the time she got home she wasn't having   symptoms any longer and still isn't having symptoms so she stopped the   antibiotic. States she has pushed tons fluids which she thinks that   helped. Does she need to take antibiotics she doesn't want to take if not   necessary. She also had some lab work done at the ED and is asking Dr Yadi Alatorre to look at the results. ---------------------------------------------------------------------------  --------------  Issa Barron [facility]  [phone]; OK to leave message on voicemail  ---------------------------------------------------------------------------  --------------  SCRIPT ANSWERS  Relationship to Patient?  Self

## 2023-04-25 ENCOUNTER — TELEPHONE (OUTPATIENT)
Dept: FAMILY MEDICINE CLINIC | Age: 53
End: 2023-04-25

## 2023-04-25 LAB
BACTERIA UR CULT: ABNORMAL
ORGANISM: ABNORMAL

## 2023-05-12 ENCOUNTER — TELEMEDICINE (OUTPATIENT)
Dept: FAMILY MEDICINE CLINIC | Age: 53
End: 2023-05-12
Payer: MEDICARE

## 2023-05-12 DIAGNOSIS — N39.0 URINARY TRACT INFECTION WITHOUT HEMATURIA, SITE UNSPECIFIED: ICD-10-CM

## 2023-05-12 DIAGNOSIS — F41.9 ANXIETY: Primary | ICD-10-CM

## 2023-05-12 PROCEDURE — 99213 OFFICE O/P EST LOW 20 MIN: CPT | Performed by: FAMILY MEDICINE

## 2023-05-12 RX ORDER — BUSPIRONE HYDROCHLORIDE 5 MG/1
5 TABLET ORAL 2 TIMES DAILY
Qty: 60 TABLET | Refills: 0 | Status: SHIPPED | OUTPATIENT
Start: 2023-05-12 | End: 2023-06-11

## 2023-05-12 SDOH — ECONOMIC STABILITY: FOOD INSECURITY: WITHIN THE PAST 12 MONTHS, THE FOOD YOU BOUGHT JUST DIDN'T LAST AND YOU DIDN'T HAVE MONEY TO GET MORE.: OFTEN TRUE

## 2023-05-12 SDOH — ECONOMIC STABILITY: INCOME INSECURITY: HOW HARD IS IT FOR YOU TO PAY FOR THE VERY BASICS LIKE FOOD, HOUSING, MEDICAL CARE, AND HEATING?: NOT HARD AT ALL

## 2023-05-12 SDOH — ECONOMIC STABILITY: HOUSING INSECURITY
IN THE LAST 12 MONTHS, WAS THERE A TIME WHEN YOU DID NOT HAVE A STEADY PLACE TO SLEEP OR SLEPT IN A SHELTER (INCLUDING NOW)?: YES

## 2023-05-12 SDOH — ECONOMIC STABILITY: FOOD INSECURITY: WITHIN THE PAST 12 MONTHS, YOU WORRIED THAT YOUR FOOD WOULD RUN OUT BEFORE YOU GOT MONEY TO BUY MORE.: OFTEN TRUE

## 2023-05-12 ASSESSMENT — PATIENT HEALTH QUESTIONNAIRE - PHQ9
SUM OF ALL RESPONSES TO PHQ QUESTIONS 1-9: 8
9. THOUGHTS THAT YOU WOULD BE BETTER OFF DEAD, OR OF HURTING YOURSELF: 0
3. TROUBLE FALLING OR STAYING ASLEEP: 1
6. FEELING BAD ABOUT YOURSELF - OR THAT YOU ARE A FAILURE OR HAVE LET YOURSELF OR YOUR FAMILY DOWN: 1
SUM OF ALL RESPONSES TO PHQ9 QUESTIONS 1 & 2: 4
2. FEELING DOWN, DEPRESSED OR HOPELESS: 2
SUM OF ALL RESPONSES TO PHQ QUESTIONS 1-9: 8
8. MOVING OR SPEAKING SO SLOWLY THAT OTHER PEOPLE COULD HAVE NOTICED. OR THE OPPOSITE, BEING SO FIGETY OR RESTLESS THAT YOU HAVE BEEN MOVING AROUND A LOT MORE THAN USUAL: 0
SUM OF ALL RESPONSES TO PHQ QUESTIONS 1-9: 8
4. FEELING TIRED OR HAVING LITTLE ENERGY: 1
SUM OF ALL RESPONSES TO PHQ QUESTIONS 1-9: 8
7. TROUBLE CONCENTRATING ON THINGS, SUCH AS READING THE NEWSPAPER OR WATCHING TELEVISION: 1
1. LITTLE INTEREST OR PLEASURE IN DOING THINGS: 2
10. IF YOU CHECKED OFF ANY PROBLEMS, HOW DIFFICULT HAVE THESE PROBLEMS MADE IT FOR YOU TO DO YOUR WORK, TAKE CARE OF THINGS AT HOME, OR GET ALONG WITH OTHER PEOPLE: 1
5. POOR APPETITE OR OVEREATING: 0

## 2023-05-12 NOTE — PROGRESS NOTES
exanthematous lesions or discoloration noted on facial skin         [] Abnormal-            Psychiatric:       [x] Normal Affect [x] No Hallucinations        [] Abnormal-       Other pertinent observable physical exam findings-     Due to this being a TeleHealth encounter, evaluation of the following organ systems is limited: Vitals/Constitutional/EENT/Resp/CV/GI//MS/Neuro/Skin/Heme-Lymph-Imm. ASSESSMENT/PLAN:  Olga Martinez was seen today for discuss labs, frequent/recurrent uti and anxiety. Diagnoses and all orders for this visit:    Anxiety    Urinary tract infection without hematuria, site unspecified    Other orders  -     busPIRone (BUSPAR) 5 MG tablet; Take 1 tablet by mouth 2 times daily    UTI is Resolved. No follow-ups on file. An  electronic signature was used to authenticate this note. --Mary Moy MD on 5/12/2023 at 12:21 }    Pursuant to the emergency declaration under the [facility] waiver authority and the [employer] Act, this Virtual  Visit was conducted, with patient's consent, to reduce the patient's risk of exposure to COVID-19 and provide continuity of care for an established patient. Services were provided through a video synchronous discussion virtually to substitute for in-person clinic visit.

## 2023-05-20 ENCOUNTER — APPOINTMENT (OUTPATIENT)
Dept: GENERAL RADIOLOGY | Age: 53
End: 2023-05-20
Payer: MEDICARE

## 2023-05-20 ENCOUNTER — HOSPITAL ENCOUNTER (EMERGENCY)
Age: 53
Discharge: HOME | End: 2023-05-20
Attending: EMERGENCY MEDICINE
Payer: MEDICARE

## 2023-05-20 VITALS
DIASTOLIC BLOOD PRESSURE: 60 MMHG | WEIGHT: 175 LBS | OXYGEN SATURATION: 95 % | HEIGHT: 63 IN | TEMPERATURE: 98.4 F | RESPIRATION RATE: 18 BRPM | BODY MASS INDEX: 31.01 KG/M2 | HEART RATE: 74 BPM | SYSTOLIC BLOOD PRESSURE: 134 MMHG

## 2023-05-20 DIAGNOSIS — R00.2 PALPITATIONS: Primary | ICD-10-CM

## 2023-05-20 LAB
ALBUMIN SERPL-MCNC: 4.4 G/DL (ref 3.5–5.2)
ALP SERPL-CCNC: 84 U/L (ref 35–104)
ALT SERPL-CCNC: 22 U/L (ref 0–32)
ANION GAP SERPL CALCULATED.3IONS-SCNC: 15 MMOL/L (ref 7–16)
AST SERPL-CCNC: 16 U/L (ref 0–31)
BASOPHILS # BLD: 0.05 E9/L (ref 0–0.2)
BASOPHILS NFR BLD: 0.7 % (ref 0–2)
BILIRUB SERPL-MCNC: 0.3 MG/DL (ref 0–1.2)
BUN SERPL-MCNC: 19 MG/DL (ref 6–20)
CALCIUM SERPL-MCNC: 9.6 MG/DL (ref 8.6–10.2)
CHLORIDE SERPL-SCNC: 100 MMOL/L (ref 98–107)
CO2 SERPL-SCNC: 21 MMOL/L (ref 22–29)
CREAT SERPL-MCNC: 1.1 MG/DL (ref 0.5–1)
EKG ATRIAL RATE: 91 BPM
EKG P AXIS: 52 DEGREES
EKG P-R INTERVAL: 188 MS
EKG Q-T INTERVAL: 372 MS
EKG QRS DURATION: 88 MS
EKG QTC CALCULATION (BAZETT): 457 MS
EKG R AXIS: -21 DEGREES
EKG T AXIS: 46 DEGREES
EKG VENTRICULAR RATE: 91 BPM
EOSINOPHIL # BLD: 0.07 E9/L (ref 0.05–0.5)
EOSINOPHIL NFR BLD: 0.9 % (ref 0–6)
ERYTHROCYTE [DISTWIDTH] IN BLOOD BY AUTOMATED COUNT: 13 FL (ref 11.5–15)
GLUCOSE SERPL-MCNC: 119 MG/DL (ref 74–99)
HCT VFR BLD AUTO: 45.1 % (ref 34–48)
HGB BLD-MCNC: 15.6 G/DL (ref 11.5–15.5)
IMM GRANULOCYTES # BLD: 0.02 E9/L
IMM GRANULOCYTES NFR BLD: 0.3 % (ref 0–5)
LYMPHOCYTES # BLD: 3.14 E9/L (ref 1.5–4)
LYMPHOCYTES NFR BLD: 41.6 % (ref 20–42)
MCH RBC QN AUTO: 32.6 PG (ref 26–35)
MCHC RBC AUTO-ENTMCNC: 34.6 % (ref 32–34.5)
MCV RBC AUTO: 94.4 FL (ref 80–99.9)
MONOCYTES # BLD: 0.44 E9/L (ref 0.1–0.95)
MONOCYTES NFR BLD: 5.8 % (ref 2–12)
NEUTROPHILS # BLD: 3.83 E9/L (ref 1.8–7.3)
NEUTS SEG NFR BLD: 50.7 % (ref 43–80)
PLATELET # BLD AUTO: 231 E9/L (ref 130–450)
PMV BLD AUTO: 9.5 FL (ref 7–12)
POTASSIUM SERPL-SCNC: 3.9 MMOL/L (ref 3.5–5)
PROT SERPL-MCNC: 7.6 G/DL (ref 6.4–8.3)
RBC # BLD AUTO: 4.78 E12/L (ref 3.5–5.5)
SODIUM SERPL-SCNC: 136 MMOL/L (ref 132–146)
TROPONIN, HIGH SENSITIVITY: <6 NG/L (ref 0–9)
TSH SERPL-MCNC: 3.48 UIU/ML (ref 0.27–4.2)
WBC # BLD: 7.6 E9/L (ref 4.5–11.5)

## 2023-05-20 PROCEDURE — 96374 THER/PROPH/DIAG INJ IV PUSH: CPT

## 2023-05-20 PROCEDURE — 84443 ASSAY THYROID STIM HORMONE: CPT

## 2023-05-20 PROCEDURE — 84484 ASSAY OF TROPONIN QUANT: CPT

## 2023-05-20 PROCEDURE — 71045 X-RAY EXAM CHEST 1 VIEW: CPT

## 2023-05-20 PROCEDURE — 93010 ELECTROCARDIOGRAM REPORT: CPT | Performed by: INTERNAL MEDICINE

## 2023-05-20 PROCEDURE — 6360000002 HC RX W HCPCS

## 2023-05-20 PROCEDURE — 99285 EMERGENCY DEPT VISIT HI MDM: CPT

## 2023-05-20 PROCEDURE — 85025 COMPLETE CBC W/AUTO DIFF WBC: CPT

## 2023-05-20 PROCEDURE — 93005 ELECTROCARDIOGRAM TRACING: CPT | Performed by: EMERGENCY MEDICINE

## 2023-05-20 PROCEDURE — 80053 COMPREHEN METABOLIC PANEL: CPT

## 2023-05-20 RX ORDER — HYDROXYZINE PAMOATE 25 MG/1
25 CAPSULE ORAL 3 TIMES DAILY PRN
Qty: 21 CAPSULE | Refills: 0 | Status: SHIPPED | OUTPATIENT
Start: 2023-05-20 | End: 2023-05-27

## 2023-05-20 RX ORDER — LORAZEPAM 2 MG/ML
0.5 INJECTION INTRAMUSCULAR
Status: COMPLETED | OUTPATIENT
Start: 2023-05-20 | End: 2023-05-20

## 2023-05-20 RX ADMIN — LORAZEPAM 0.5 MG: 2 INJECTION INTRAMUSCULAR; INTRAVENOUS at 04:07

## 2023-05-20 NOTE — ED PROVIDER NOTES
destruction. Social History:  reports that she has been smoking cigarettes. She has been smoking an average of .5 packs per day. She has never used smokeless tobacco. She reports current alcohol use. She reports that she does not use drugs. Family History: family history includes Anxiety Disorder in her father; Cancer in her maternal grandmother and sister; Depression in her father; Diabetes in her mother; Heart Disease in her mother; Kidney Disease in her sister; No Known Problems in her maternal grandfather, paternal grandfather, and paternal grandmother; Obesity in her mother; Other in her sister; Parkinsonism in her father. The patients home medications have been reviewed.     Allergies: Decongestant [pseudoephedrine hcl], Ceclor [cefaclor], Clindamycin/lincomycin, Penicillins, Prednisone, Prochlorperazine edisylate, and Sulfa antibiotics    -------------------------------------------------- RESULTS -------------------------------------------------    Labs reviewed and interpreted by me:  Results for orders placed or performed during the hospital encounter of 05/20/23   CBC with Auto Differential   Result Value Ref Range    WBC 7.6 4.5 - 11.5 E9/L    RBC 4.78 3.50 - 5.50 E12/L    Hemoglobin 15.6 (H) 11.5 - 15.5 g/dL    Hematocrit 45.1 34.0 - 48.0 %    MCV 94.4 80.0 - 99.9 fL    MCH 32.6 26.0 - 35.0 pg    MCHC 34.6 (H) 32.0 - 34.5 %    RDW 13.0 11.5 - 15.0 fL    Platelets 717 031 - 598 E9/L    MPV 9.5 7.0 - 12.0 fL    Neutrophils % 50.7 43.0 - 80.0 %    Immature Granulocytes % 0.3 0.0 - 5.0 %    Lymphocytes % 41.6 20.0 - 42.0 %    Monocytes % 5.8 2.0 - 12.0 %    Eosinophils % 0.9 0.0 - 6.0 %    Basophils % 0.7 0.0 - 2.0 %    Neutrophils Absolute 3.83 1.80 - 7.30 E9/L    Immature Granulocytes # 0.02 E9/L    Lymphocytes Absolute 3.14 1.50 - 4.00 E9/L    Monocytes Absolute 0.44 0.10 - 0.95 E9/L    Eosinophils Absolute 0.07 0.05 - 0.50 E9/L    Basophils Absolute 0.05 0.00 - 0.20 E9/L   Comprehensive

## 2023-05-26 ENCOUNTER — HOSPITAL ENCOUNTER (EMERGENCY)
Age: 53
Discharge: HOME | End: 2023-05-26
Payer: MEDICARE

## 2023-05-26 ENCOUNTER — APPOINTMENT (OUTPATIENT)
Dept: GENERAL RADIOLOGY | Age: 53
End: 2023-05-26
Payer: MEDICARE

## 2023-05-26 VITALS
HEART RATE: 68 BPM | OXYGEN SATURATION: 96 % | RESPIRATION RATE: 18 BRPM | TEMPERATURE: 97.4 F | SYSTOLIC BLOOD PRESSURE: 142 MMHG | DIASTOLIC BLOOD PRESSURE: 65 MMHG

## 2023-05-26 DIAGNOSIS — R00.2 PALPITATIONS: Primary | ICD-10-CM

## 2023-05-26 LAB
ALBUMIN SERPL-MCNC: 4.7 G/DL (ref 3.5–5.2)
ALP SERPL-CCNC: 86 U/L (ref 35–104)
ALT SERPL-CCNC: 24 U/L (ref 0–32)
ANION GAP SERPL CALCULATED.3IONS-SCNC: 10 MMOL/L (ref 7–16)
AST SERPL-CCNC: 16 U/L (ref 0–31)
BACTERIA URNS QL MICRO: ABNORMAL /HPF
BASOPHILS # BLD: 0.06 E9/L (ref 0–0.2)
BASOPHILS NFR BLD: 0.7 % (ref 0–2)
BILIRUB SERPL-MCNC: 0.5 MG/DL (ref 0–1.2)
BILIRUB UR QL STRIP: NEGATIVE
BUN SERPL-MCNC: 12 MG/DL (ref 6–20)
CALCIUM SERPL-MCNC: 9.8 MG/DL (ref 8.6–10.2)
CHLORIDE SERPL-SCNC: 102 MMOL/L (ref 98–107)
CLARITY UR: CLEAR
CO2 SERPL-SCNC: 26 MMOL/L (ref 22–29)
COLOR UR: YELLOW
CREAT SERPL-MCNC: 0.5 MG/DL (ref 0.5–1)
D DIMER: <200 NG/ML DDU
EOSINOPHIL # BLD: 0.06 E9/L (ref 0.05–0.5)
EOSINOPHIL NFR BLD: 0.7 % (ref 0–6)
ERYTHROCYTE [DISTWIDTH] IN BLOOD BY AUTOMATED COUNT: 12.6 FL (ref 11.5–15)
GLUCOSE SERPL-MCNC: 103 MG/DL (ref 74–99)
GLUCOSE UR STRIP-MCNC: NEGATIVE MG/DL
HCT VFR BLD AUTO: 45.1 % (ref 34–48)
HGB BLD-MCNC: 15.7 G/DL (ref 11.5–15.5)
HGB UR QL STRIP: NORMAL
IMM GRANULOCYTES # BLD: 0.02 E9/L
IMM GRANULOCYTES NFR BLD: 0.2 % (ref 0–5)
KETONES UR STRIP-MCNC: NEGATIVE MG/DL
LEUKOCYTE ESTERASE UR QL STRIP: NEGATIVE
LYMPHOCYTES # BLD: 2.84 E9/L (ref 1.5–4)
LYMPHOCYTES NFR BLD: 32.4 % (ref 20–42)
MAGNESIUM SERPL-MCNC: 2.1 MG/DL (ref 1.6–2.6)
MCH RBC QN AUTO: 32.7 PG (ref 26–35)
MCHC RBC AUTO-ENTMCNC: 34.8 % (ref 32–34.5)
MCV RBC AUTO: 94 FL (ref 80–99.9)
MONOCYTES # BLD: 0.65 E9/L (ref 0.1–0.95)
MONOCYTES NFR BLD: 7.4 % (ref 2–12)
NEUTROPHILS # BLD: 5.14 E9/L (ref 1.8–7.3)
NEUTS SEG NFR BLD: 58.6 % (ref 43–80)
NITRITE UR QL STRIP: NEGATIVE
PH UR STRIP: 6 [PH] (ref 5–9)
PLATELET # BLD AUTO: 215 E9/L (ref 130–450)
PMV BLD AUTO: 9.6 FL (ref 7–12)
POTASSIUM SERPL-SCNC: 3.9 MMOL/L (ref 3.5–5)
PROT SERPL-MCNC: 7.7 G/DL (ref 6.4–8.3)
PROT UR STRIP-MCNC: NEGATIVE MG/DL
RBC # BLD AUTO: 4.8 E12/L (ref 3.5–5.5)
RBC #/AREA URNS HPF: ABNORMAL /HPF (ref 0–2)
SODIUM SERPL-SCNC: 138 MMOL/L (ref 132–146)
SP GR UR STRIP: <=1.005 (ref 1–1.03)
TROPONIN, HIGH SENSITIVITY: <6 NG/L (ref 0–9)
TROPONIN, HIGH SENSITIVITY: <6 NG/L (ref 0–9)
TSH SERPL-MCNC: 0.9 UIU/ML (ref 0.27–4.2)
UROBILINOGEN UR STRIP-ACNC: 0.2 E.U./DL
WBC # BLD: 8.8 E9/L (ref 4.5–11.5)
WBC #/AREA URNS HPF: ABNORMAL /HPF (ref 0–5)

## 2023-05-26 PROCEDURE — 85025 COMPLETE CBC W/AUTO DIFF WBC: CPT

## 2023-05-26 PROCEDURE — 85378 FIBRIN DEGRADE SEMIQUANT: CPT

## 2023-05-26 PROCEDURE — 84484 ASSAY OF TROPONIN QUANT: CPT

## 2023-05-26 PROCEDURE — 93005 ELECTROCARDIOGRAM TRACING: CPT | Performed by: NURSE PRACTITIONER

## 2023-05-26 PROCEDURE — 71045 X-RAY EXAM CHEST 1 VIEW: CPT

## 2023-05-26 PROCEDURE — 84443 ASSAY THYROID STIM HORMONE: CPT

## 2023-05-26 PROCEDURE — 99285 EMERGENCY DEPT VISIT HI MDM: CPT

## 2023-05-26 PROCEDURE — 83735 ASSAY OF MAGNESIUM: CPT

## 2023-05-26 PROCEDURE — 80053 COMPREHEN METABOLIC PANEL: CPT

## 2023-05-26 PROCEDURE — 81001 URINALYSIS AUTO W/SCOPE: CPT

## 2023-05-26 NOTE — ED NOTES
Department of Emergency Medicine  FIRST PROVIDER TRIAGE NOTE             Independent MLP           5/26/23  6:33 PM EDT    Date of Encounter: 5/26/23   MRN: [id number]      HPI: Yudy Juarez is a 52 y.o. female who presents to the ED for Chest Pain (Pt c/o severe chest pain like someone is punching her chest. Been going on for 2weeks, PCP told her to come in )       ROS: Negative for cp or sob. PE: Gen Appearance/Constitutional: alert  HEENT: NC/NT. PERRLA,  Airway patent. Initial Plan of Care: All treatment areas with department are currently occupied. Plan to order/Initiate the following while awaiting opening in ED: labs, EKG, and imaging studies.   Initiate Treatment-Testing, Proceed toTreatment Area When Bed Available for ED Attending/MLP to Continue Care    Electronically signed by DANIA Kaufman CNP   DD: 5/26/23       DANIA Kaufman CNP  05/26/23 7843

## 2023-05-27 NOTE — ED NOTES
Repeat trop was drawn and sent down to lab, patient was placed back in the waiting room      [place] Mode  05/26/23 7165

## 2023-05-27 NOTE — ED PROVIDER NOTES
independent  HPI:  5/26/23, Time: 10:31 PM EDT         Shashank Álvarez is a 52 y.o. female presenting to the ED for 2-week history of palpitations. Patient presents to the emergency department states that over the last 2 weeks she been having palpitations she also reports that she will have a sharp pain lasting seconds at a time as well. She does report that she recently started walking and feels that she may be dehydrated. Patient otherwise denies any recent long travel no unusual lower extremity swelling she also denies any caffeine or energy drink use as well as no noted diet pills or workout supplements. She did notify her primary care doctor who advised her to come here to the emergency department because there is concerns that she may be dehydrated. Patient also reports that she is taking metoprolol and feels that maybe that prescription is old. Review of Systems:   A complete review of systems was performed and pertinent positives and negatives are stated within HPI, all other systems reviewed and are negative.          --------------------------------------------- PAST HISTORY ---------------------------------------------  Past Medical History:  has a past medical history of Allergic rhinitis, Anxiety, [facility] ([facility] Chronic back pain, Depression, Hypertension, Mitral valve prolapse, Osteoarthritis, and Substance abuse ([facility] Past Surgical History:  has a past surgical history that includes [place] tooth extraction; Tubal ligation; and Cervix lesion destruction. Social History:  reports that she has been smoking cigarettes. She has been smoking an average of .5 packs per day. She has never used smokeless tobacco. She reports current alcohol use. She reports that she does not use drugs.     Family History: family history includes Anxiety Disorder in her father; Cancer in her maternal grandmother and sister; Depression in her father; Diabetes in her mother; Heart Disease in her mother; Kidney

## 2023-05-28 LAB
EKG ATRIAL RATE: 65 BPM
EKG P AXIS: 43 DEGREES
EKG P-R INTERVAL: 182 MS
EKG Q-T INTERVAL: 410 MS
EKG QRS DURATION: 90 MS
EKG QTC CALCULATION (BAZETT): 426 MS
EKG R AXIS: -10 DEGREES
EKG T AXIS: 40 DEGREES
EKG VENTRICULAR RATE: 65 BPM

## 2023-05-28 PROCEDURE — 93010 ELECTROCARDIOGRAM REPORT: CPT | Performed by: INTERNAL MEDICINE

## 2023-05-30 ENCOUNTER — TELEPHONE (OUTPATIENT)
Dept: ADMINISTRATIVE | Age: 53
End: 2023-05-30

## 2023-05-30 DIAGNOSIS — I47.1 SVT (SUPRAVENTRICULAR TACHYCARDIA) (HCC): Primary | ICD-10-CM

## 2023-05-31 NOTE — TELEPHONE ENCOUNTER
Patient is stating she is still having symptoms and she is wondering if she can wear a monitor.   Please advise

## 2023-06-11 ENCOUNTER — HOSPITAL ENCOUNTER (EMERGENCY)
Age: 53
Discharge: HOME | DRG: 897 | End: 2023-06-11
Attending: EMERGENCY MEDICINE
Payer: MEDICARE

## 2023-06-11 ENCOUNTER — APPOINTMENT (OUTPATIENT)
Dept: GENERAL RADIOLOGY | Age: 53
DRG: 897 | End: 2023-06-11
Payer: MEDICARE

## 2023-06-11 VITALS
DIASTOLIC BLOOD PRESSURE: 103 MMHG | HEIGHT: 63 IN | BODY MASS INDEX: 31.01 KG/M2 | HEART RATE: 74 BPM | RESPIRATION RATE: 19 BRPM | TEMPERATURE: 98.2 F | WEIGHT: 175 LBS | SYSTOLIC BLOOD PRESSURE: 139 MMHG | OXYGEN SATURATION: 98 %

## 2023-06-11 DIAGNOSIS — R00.2 PALPITATIONS: Primary | ICD-10-CM

## 2023-06-11 LAB
ALBUMIN SERPL-MCNC: 4.3 G/DL (ref 3.5–5.2)
ALP SERPL-CCNC: 83 U/L (ref 35–104)
ALT SERPL-CCNC: 18 U/L (ref 0–32)
ANION GAP SERPL CALCULATED.3IONS-SCNC: 15 MMOL/L (ref 7–16)
AST SERPL-CCNC: 15 U/L (ref 0–31)
BASOPHILS # BLD: 0.03 E9/L (ref 0–0.2)
BASOPHILS NFR BLD: 0.4 % (ref 0–2)
BILIRUB SERPL-MCNC: 0.3 MG/DL (ref 0–1.2)
BUN SERPL-MCNC: 18 MG/DL (ref 6–20)
CALCIUM SERPL-MCNC: 9.4 MG/DL (ref 8.6–10.2)
CHLORIDE SERPL-SCNC: 100 MMOL/L (ref 98–107)
CO2 SERPL-SCNC: 23 MMOL/L (ref 22–29)
CREAT SERPL-MCNC: 0.6 MG/DL (ref 0.5–1)
EOSINOPHIL # BLD: 0.09 E9/L (ref 0.05–0.5)
EOSINOPHIL NFR BLD: 1.1 % (ref 0–6)
ERYTHROCYTE [DISTWIDTH] IN BLOOD BY AUTOMATED COUNT: 12.5 FL (ref 11.5–15)
GLUCOSE SERPL-MCNC: 112 MG/DL (ref 74–99)
HCT VFR BLD AUTO: 45.8 % (ref 34–48)
HGB BLD-MCNC: 15.3 G/DL (ref 11.5–15.5)
IMM GRANULOCYTES # BLD: 0.02 E9/L
IMM GRANULOCYTES NFR BLD: 0.2 % (ref 0–5)
LYMPHOCYTES # BLD: 3.33 E9/L (ref 1.5–4)
LYMPHOCYTES NFR BLD: 40.4 % (ref 20–42)
MCH RBC QN AUTO: 31.6 PG (ref 26–35)
MCHC RBC AUTO-ENTMCNC: 33.4 % (ref 32–34.5)
MCV RBC AUTO: 94.6 FL (ref 80–99.9)
MONOCYTES # BLD: 0.53 E9/L (ref 0.1–0.95)
MONOCYTES NFR BLD: 6.4 % (ref 2–12)
NEUTROPHILS # BLD: 4.24 E9/L (ref 1.8–7.3)
NEUTS SEG NFR BLD: 51.5 % (ref 43–80)
PLATELET # BLD AUTO: 246 E9/L (ref 130–450)
PMV BLD AUTO: 9.8 FL (ref 7–12)
POTASSIUM SERPL-SCNC: 3.8 MMOL/L (ref 3.5–5)
PROT SERPL-MCNC: 7.5 G/DL (ref 6.4–8.3)
RBC # BLD AUTO: 4.84 E12/L (ref 3.5–5.5)
SODIUM SERPL-SCNC: 138 MMOL/L (ref 132–146)
TROPONIN, HIGH SENSITIVITY: <6 NG/L (ref 0–9)
TSH SERPL-MCNC: 1.94 UIU/ML (ref 0.27–4.2)
WBC # BLD: 8.2 E9/L (ref 4.5–11.5)

## 2023-06-11 PROCEDURE — 6370000000 HC RX 637 (ALT 250 FOR IP): Performed by: STUDENT IN AN ORGANIZED HEALTH CARE EDUCATION/TRAINING PROGRAM

## 2023-06-11 PROCEDURE — 99285 EMERGENCY DEPT VISIT HI MDM: CPT

## 2023-06-11 PROCEDURE — 93005 ELECTROCARDIOGRAM TRACING: CPT | Performed by: STUDENT IN AN ORGANIZED HEALTH CARE EDUCATION/TRAINING PROGRAM

## 2023-06-11 PROCEDURE — 85025 COMPLETE CBC W/AUTO DIFF WBC: CPT

## 2023-06-11 PROCEDURE — 84484 ASSAY OF TROPONIN QUANT: CPT

## 2023-06-11 PROCEDURE — 80053 COMPREHEN METABOLIC PANEL: CPT

## 2023-06-11 PROCEDURE — 84443 ASSAY THYROID STIM HORMONE: CPT

## 2023-06-11 PROCEDURE — 71045 X-RAY EXAM CHEST 1 VIEW: CPT

## 2023-06-11 RX ORDER — ATENOLOL 25 MG/1
25 TABLET ORAL ONCE
Status: COMPLETED | OUTPATIENT
Start: 2023-06-11 | End: 2023-06-11

## 2023-06-11 RX ORDER — ATENOLOL 25 MG/1
50 TABLET ORAL ONCE
Status: COMPLETED | OUTPATIENT
Start: 2023-06-11 | End: 2023-06-11

## 2023-06-11 RX ADMIN — ATENOLOL 50 MG: 25 TABLET ORAL at 07:02

## 2023-06-11 RX ADMIN — ATENOLOL 25 MG: 25 TABLET ORAL at 07:06

## 2023-06-11 ASSESSMENT — PAIN DESCRIPTION - ORIENTATION: ORIENTATION: LEFT

## 2023-06-11 ASSESSMENT — PAIN DESCRIPTION - LOCATION: LOCATION: CHEST

## 2023-06-11 ASSESSMENT — PAIN - FUNCTIONAL ASSESSMENT: PAIN_FUNCTIONAL_ASSESSMENT: 0-10

## 2023-06-12 LAB
EKG ATRIAL RATE: 84 BPM
EKG P AXIS: 33 DEGREES
EKG P-R INTERVAL: 194 MS
EKG Q-T INTERVAL: 386 MS
EKG QRS DURATION: 86 MS
EKG QTC CALCULATION (BAZETT): 456 MS
EKG R AXIS: -10 DEGREES
EKG T AXIS: 26 DEGREES
EKG VENTRICULAR RATE: 84 BPM

## 2023-06-12 PROCEDURE — 93010 ELECTROCARDIOGRAM REPORT: CPT | Performed by: INTERNAL MEDICINE

## 2023-06-13 ENCOUNTER — APPOINTMENT (OUTPATIENT)
Dept: GENERAL RADIOLOGY | Age: 53
DRG: 897 | End: 2023-06-13
Payer: MEDICARE

## 2023-06-13 ENCOUNTER — HOSPITAL ENCOUNTER (INPATIENT)
Age: 53
LOS: 2 days | Discharge: HOME | DRG: 897 | End: 2023-06-15
Attending: EMERGENCY MEDICINE | Admitting: INTERNAL MEDICINE
Payer: MEDICARE

## 2023-06-13 DIAGNOSIS — F10.930 ALCOHOL WITHDRAWAL SYNDROME WITHOUT COMPLICATION (HCC): ICD-10-CM

## 2023-06-13 DIAGNOSIS — R00.2 PALPITATIONS: ICD-10-CM

## 2023-06-13 DIAGNOSIS — R07.9 CHEST PAIN, UNSPECIFIED TYPE: Primary | ICD-10-CM

## 2023-06-13 LAB
BASOPHILS # BLD: 0.05 E9/L (ref 0–0.2)
BASOPHILS NFR BLD: 0.6 % (ref 0–2)
EOSINOPHIL # BLD: 0.09 E9/L (ref 0.05–0.5)
EOSINOPHIL NFR BLD: 1.1 % (ref 0–6)
ERYTHROCYTE [DISTWIDTH] IN BLOOD BY AUTOMATED COUNT: 12.6 FL (ref 11.5–15)
HCT VFR BLD AUTO: 46.2 % (ref 34–48)
HGB BLD-MCNC: 15.7 G/DL (ref 11.5–15.5)
IMM GRANULOCYTES # BLD: 0.06 E9/L
IMM GRANULOCYTES NFR BLD: 0.7 % (ref 0–5)
LYMPHOCYTES # BLD: 2.92 E9/L (ref 1.5–4)
LYMPHOCYTES NFR BLD: 35.4 % (ref 20–42)
MCH RBC QN AUTO: 32 PG (ref 26–35)
MCHC RBC AUTO-ENTMCNC: 34 % (ref 32–34.5)
MCV RBC AUTO: 94.3 FL (ref 80–99.9)
MONOCYTES # BLD: 0.52 E9/L (ref 0.1–0.95)
MONOCYTES NFR BLD: 6.3 % (ref 2–12)
NEUTROPHILS # BLD: 4.62 E9/L (ref 1.8–7.3)
NEUTS SEG NFR BLD: 55.9 % (ref 43–80)
PLATELET # BLD AUTO: 239 E9/L (ref 130–450)
PMV BLD AUTO: 9.9 FL (ref 7–12)
RBC # BLD AUTO: 4.9 E12/L (ref 3.5–5.5)
WBC # BLD: 8.3 E9/L (ref 4.5–11.5)

## 2023-06-13 PROCEDURE — 80179 DRUG ASSAY SALICYLATE: CPT

## 2023-06-13 PROCEDURE — 84484 ASSAY OF TROPONIN QUANT: CPT

## 2023-06-13 PROCEDURE — 93005 ELECTROCARDIOGRAM TRACING: CPT | Performed by: EMERGENCY MEDICINE

## 2023-06-13 PROCEDURE — 80143 DRUG ASSAY ACETAMINOPHEN: CPT

## 2023-06-13 PROCEDURE — 82077 ASSAY SPEC XCP UR&BREATH IA: CPT

## 2023-06-13 PROCEDURE — 71045 X-RAY EXAM CHEST 1 VIEW: CPT

## 2023-06-13 PROCEDURE — 99285 EMERGENCY DEPT VISIT HI MDM: CPT

## 2023-06-13 PROCEDURE — 80053 COMPREHEN METABOLIC PANEL: CPT

## 2023-06-13 PROCEDURE — 85025 COMPLETE CBC W/AUTO DIFF WBC: CPT

## 2023-06-13 PROCEDURE — 84443 ASSAY THYROID STIM HORMONE: CPT

## 2023-06-13 PROCEDURE — 2060000000 HC ICU INTERMEDIATE R&B

## 2023-06-13 PROCEDURE — 85378 FIBRIN DEGRADE SEMIQUANT: CPT

## 2023-06-13 PROCEDURE — 6360000002 HC RX W HCPCS: Performed by: EMERGENCY MEDICINE

## 2023-06-13 PROCEDURE — HZ2ZZZZ DETOXIFICATION SERVICES FOR SUBSTANCE ABUSE TREATMENT: ICD-10-PCS | Performed by: INTERNAL MEDICINE

## 2023-06-13 PROCEDURE — 80307 DRUG TEST PRSMV CHEM ANLYZR: CPT

## 2023-06-13 RX ORDER — LORAZEPAM 2 MG/ML
1 INJECTION INTRAMUSCULAR ONCE
Status: COMPLETED | OUTPATIENT
Start: 2023-06-13 | End: 2023-06-13

## 2023-06-13 RX ORDER — LORAZEPAM 1 MG/1
1 TABLET ORAL
Status: DISCONTINUED | OUTPATIENT
Start: 2023-06-13 | End: 2023-06-15 | Stop reason: HOSPADM

## 2023-06-13 RX ORDER — LORAZEPAM 2 MG/ML
2 INJECTION INTRAMUSCULAR
Status: DISCONTINUED | OUTPATIENT
Start: 2023-06-13 | End: 2023-06-15 | Stop reason: HOSPADM

## 2023-06-13 RX ORDER — LANOLIN ALCOHOL/MO/W.PET/CERES
100 CREAM (GRAM) TOPICAL DAILY
Status: DISCONTINUED | OUTPATIENT
Start: 2023-06-14 | End: 2023-06-15 | Stop reason: HOSPADM

## 2023-06-13 RX ORDER — LORAZEPAM 1 MG/1
3 TABLET ORAL
Status: DISCONTINUED | OUTPATIENT
Start: 2023-06-13 | End: 2023-06-15 | Stop reason: HOSPADM

## 2023-06-13 RX ORDER — LORAZEPAM 2 MG/ML
1 INJECTION INTRAMUSCULAR
Status: DISCONTINUED | OUTPATIENT
Start: 2023-06-13 | End: 2023-06-15 | Stop reason: HOSPADM

## 2023-06-13 RX ORDER — SODIUM CHLORIDE 0.9 % (FLUSH) 0.9 %
5-40 SYRINGE (ML) INJECTION PRN
Status: DISCONTINUED | OUTPATIENT
Start: 2023-06-13 | End: 2023-06-15 | Stop reason: HOSPADM

## 2023-06-13 RX ORDER — SODIUM CHLORIDE 0.9 % (FLUSH) 0.9 %
5-40 SYRINGE (ML) INJECTION EVERY 12 HOURS SCHEDULED
Status: DISCONTINUED | OUTPATIENT
Start: 2023-06-13 | End: 2023-06-15 | Stop reason: HOSPADM

## 2023-06-13 RX ORDER — SODIUM CHLORIDE 9 MG/ML
INJECTION, SOLUTION INTRAVENOUS PRN
Status: DISCONTINUED | OUTPATIENT
Start: 2023-06-13 | End: 2023-06-15 | Stop reason: HOSPADM

## 2023-06-13 RX ORDER — LORAZEPAM 1 MG/1
2 TABLET ORAL
Status: DISCONTINUED | OUTPATIENT
Start: 2023-06-13 | End: 2023-06-15 | Stop reason: HOSPADM

## 2023-06-13 RX ORDER — LORAZEPAM 1 MG/1
4 TABLET ORAL
Status: DISCONTINUED | OUTPATIENT
Start: 2023-06-13 | End: 2023-06-15 | Stop reason: HOSPADM

## 2023-06-13 RX ORDER — LORAZEPAM 2 MG/ML
4 INJECTION INTRAMUSCULAR
Status: DISCONTINUED | OUTPATIENT
Start: 2023-06-13 | End: 2023-06-15 | Stop reason: HOSPADM

## 2023-06-13 RX ORDER — LORAZEPAM 2 MG/ML
3 INJECTION INTRAMUSCULAR
Status: DISCONTINUED | OUTPATIENT
Start: 2023-06-13 | End: 2023-06-15 | Stop reason: HOSPADM

## 2023-06-13 RX ADMIN — LORAZEPAM 1 MG: 2 INJECTION INTRAMUSCULAR; INTRAVENOUS at 23:02

## 2023-06-13 ASSESSMENT — PAIN - FUNCTIONAL ASSESSMENT: PAIN_FUNCTIONAL_ASSESSMENT: NONE - DENIES PAIN

## 2023-06-14 LAB
ALBUMIN SERPL-MCNC: 4.5 G/DL (ref 3.5–5.2)
ALP SERPL-CCNC: 78 U/L (ref 35–104)
ALT SERPL-CCNC: 16 U/L (ref 0–32)
AMPHET UR QL SCN: NOT DETECTED
ANION GAP SERPL CALCULATED.3IONS-SCNC: 15 MMOL/L (ref 7–16)
APAP SERPL-MCNC: <5 MCG/ML (ref 10–30)
AST SERPL-CCNC: 14 U/L (ref 0–31)
BARBITURATES UR QL SCN: NOT DETECTED
BENZODIAZ UR QL SCN: NOT DETECTED
BILIRUB SERPL-MCNC: 0.3 MG/DL (ref 0–1.2)
BUN SERPL-MCNC: 16 MG/DL (ref 6–20)
CALCIUM SERPL-MCNC: 9.7 MG/DL (ref 8.6–10.2)
CANNABINOIDS UR QL SCN: NOT DETECTED
CHLORIDE SERPL-SCNC: 104 MMOL/L (ref 98–107)
CO2 SERPL-SCNC: 23 MMOL/L (ref 22–29)
COCAINE UR QL SCN: NOT DETECTED
CREAT SERPL-MCNC: 0.5 MG/DL (ref 0.5–1)
D DIMER: <200 NG/ML DDU
DRUG SCREEN COMMENT UR-IMP: NORMAL
EKG ATRIAL RATE: 67 BPM
EKG P AXIS: 60 DEGREES
EKG P-R INTERVAL: 188 MS
EKG Q-T INTERVAL: 412 MS
EKG QRS DURATION: 92 MS
EKG QTC CALCULATION (BAZETT): 435 MS
EKG R AXIS: -24 DEGREES
EKG T AXIS: 57 DEGREES
EKG VENTRICULAR RATE: 67 BPM
ETHANOLAMINE SERPL-MCNC: <10 MG/DL (ref 0–0.08)
FENTANYL SCREEN, URINE: NOT DETECTED
GLUCOSE SERPL-MCNC: 101 MG/DL (ref 74–99)
METHADONE UR QL SCN: NOT DETECTED
OPIATES UR QL SCN: NOT DETECTED
OXYCODONE URINE: NOT DETECTED
PCP UR QL SCN: NOT DETECTED
POTASSIUM SERPL-SCNC: 3.8 MMOL/L (ref 3.5–5)
PROT SERPL-MCNC: 7.4 G/DL (ref 6.4–8.3)
SALICYLATES SERPL-MCNC: <0.3 MG/DL (ref 0–30)
SODIUM SERPL-SCNC: 142 MMOL/L (ref 132–146)
TRICYCLIC ANTIDEPRESSANTS SCREEN SERUM: NEGATIVE NG/ML
TROPONIN, HIGH SENSITIVITY: <6 NG/L (ref 0–9)
TSH SERPL-MCNC: 1.16 UIU/ML (ref 0.27–4.2)

## 2023-06-14 PROCEDURE — 2580000003 HC RX 258: Performed by: EMERGENCY MEDICINE

## 2023-06-14 PROCEDURE — 6370000000 HC RX 637 (ALT 250 FOR IP): Performed by: EMERGENCY MEDICINE

## 2023-06-14 PROCEDURE — 2060000000 HC ICU INTERMEDIATE R&B

## 2023-06-14 PROCEDURE — 93010 ELECTROCARDIOGRAM REPORT: CPT | Performed by: INTERNAL MEDICINE

## 2023-06-14 RX ORDER — BUSPIRONE HYDROCHLORIDE 10 MG/1
5 TABLET ORAL 3 TIMES DAILY PRN
Status: DISCONTINUED | OUTPATIENT
Start: 2023-06-14 | End: 2023-06-15

## 2023-06-14 RX ORDER — MULTIVITAMIN WITH IRON
250 TABLET ORAL DAILY PRN
COMMUNITY

## 2023-06-14 RX ORDER — ATENOLOL 50 MG/1
75 TABLET ORAL DAILY
COMMUNITY

## 2023-06-14 RX ORDER — ATENOLOL 25 MG/1
75 TABLET ORAL ONCE
Status: COMPLETED | OUTPATIENT
Start: 2023-06-14 | End: 2023-06-14

## 2023-06-14 RX ADMIN — Medication 100 MG: at 09:30

## 2023-06-14 RX ADMIN — ATENOLOL 75 MG: 25 TABLET ORAL at 08:31

## 2023-06-14 RX ADMIN — Medication 10 ML: at 04:29

## 2023-06-14 RX ADMIN — Medication 10 ML: at 10:34

## 2023-06-14 RX ADMIN — Medication 10 ML: at 20:43

## 2023-06-14 ASSESSMENT — PAIN SCALES - GENERAL
PAINLEVEL_OUTOF10: 0

## 2023-06-14 NOTE — PROGRESS NOTES
Message sent to Dr. Arturo Gracia via [employer] re: Patient arrived to [phone] from ED. Home medication not currently ordered. Patient states that you recently wrote her a prescription outpatient for Buspar. She states she had not filled the script yet, but she is requesting to receive it here. She is unsure of dosage. She said she will not take the Ativan for withdrawals unless they become severe. She is very anxious.     Electronically signed by Tully Phalen, RN on 6/14/2023 at 5:43 PM

## 2023-06-14 NOTE — ED NOTES
Patient keys placed with PD at the front per patient request so her daughter can pick them up     Micheal Rodriguez RN  06/14/23 0916

## 2023-06-14 NOTE — ED NOTES
Patient sitting up in bed appears to be anxious, this RN asked if she is ok and she stated \"just nervous, I have been here almost 24 hours and that when a hell breaks loose\" when asked what she means she said \" she is scared about withdrawing from alcohol and having palpitations again\" this RN asked if she wants meds for withdrawal symptoms which she declined unless she really needs it because she doesn't want to get used to taking something else.       Watson Pedro RN  06/14/23 0560

## 2023-06-14 NOTE — ED PROVIDER NOTES
x-ray showed no infiltrate or effusion. I did speak to Dr. Herminia Nassar who is taking care of patient outpatient. Patient to be admitted. Patient is having palpitation as well as having intermittent pains in her chest also may be possibly going through alcohol withdrawal.  Patient will be started on CIWA protocol. Patient was given 1 mg Ativan here in the emergency department. Patient will be admitted to monitored bed. Social Determinants affecting Dx or Tx: Patient does smoke    Chronic Conditions: hx of allergies anxiety chronic back pain depression hypertension mitral valve prolapse osteoarthritis history of substance abuse    Records Reviewed:   Seen for palpitations on June 11, 2023 she was treated released she was also seen on 5/26/2023 for palpitations. Re-Evaluations:             reevaluated patient was made aware of findings and plan. Patient medicated with Ativan. Patient will be admitted to monitored bed. Consultations:           Dr. Hien Lopez: This patient's ED course included: a personal history and physicial eaxmination    This patient has been closely monitored during their ED course. Counseling: The emergency provider has spoken with the patient and discussed todays results, in addition to providing specific details for the plan of care and counseling regarding the diagnosis and prognosis. Questions are answered at this time and they are agreeable with the plan.       --------------------------------- IMPRESSION AND DISPOSITION ---------------------------------    IMPRESSION  1. Chest pain, unspecified type    2. Palpitations    3. Alcohol withdrawal syndrome without complication ([facility]        DISPOSITION  Disposition: Admit to monitored bed  Patient condition is stable        NOTE: This report was transcribed using voice recognition software.  Every effort was made to ensure accuracy; however, inadvertent computerized transcription errors may be

## 2023-06-14 NOTE — PROGRESS NOTES
4 Eyes Skin Assessment     NAME:  Pat Romano  YOB: 1970  MEDICAL RECORD NUMBER:  [id number]    The patient is being assessed for  Admission    I agree that at least one RN has performed a thorough Head to Toe Skin Assessment on the patient. ALL assessment sites listed below have been assessed. Areas assessed by both nurses:    Head, Face, Ears, Shoulders, Back, Chest, Arms, Elbows, Hands, and Legs. Feet and Heels        Does the Patient have a Wound? No noted wound(s)  Small dry, red area to upper back.         Ry Prevention initiated by RN: No  Wound Care Orders initiated by RN: No    Pressure Injury (Stage 3,4, Unstageable, DTI, NWPT, and Complex wounds) if present, place Wound referral order by RN under : No    New Ostomies, if present place, Ostomy referral order under : No     Nurse 1 eSignature: Electronically signed by Jose Rafael Montano RN on 6/14/23 at 3:39 PM EDT    **SHARE this note so that the co-signing nurse can place an eSignature**    Nurse 2 eSignature: Electronically signed by Juan Manuel Vidales RN on 6/14/23 at 5:56 PM EDT

## 2023-06-15 VITALS
HEART RATE: 60 BPM | SYSTOLIC BLOOD PRESSURE: 127 MMHG | OXYGEN SATURATION: 97 % | BODY MASS INDEX: 30.12 KG/M2 | HEIGHT: 63 IN | WEIGHT: 170 LBS | RESPIRATION RATE: 16 BRPM | DIASTOLIC BLOOD PRESSURE: 75 MMHG | TEMPERATURE: 97.5 F

## 2023-06-15 PROCEDURE — 6370000000 HC RX 637 (ALT 250 FOR IP): Performed by: INTERNAL MEDICINE

## 2023-06-15 PROCEDURE — 6370000000 HC RX 637 (ALT 250 FOR IP): Performed by: EMERGENCY MEDICINE

## 2023-06-15 PROCEDURE — 2580000003 HC RX 258: Performed by: EMERGENCY MEDICINE

## 2023-06-15 RX ORDER — BUSPIRONE HYDROCHLORIDE 10 MG/1
10 TABLET ORAL 3 TIMES DAILY PRN
Qty: 30 TABLET | Refills: 0 | Status: SHIPPED | OUTPATIENT
Start: 2023-06-15 | End: 2023-06-23 | Stop reason: SDUPTHER

## 2023-06-15 RX ORDER — BUSPIRONE HYDROCHLORIDE 10 MG/1
10 TABLET ORAL 3 TIMES DAILY PRN
Status: DISCONTINUED | OUTPATIENT
Start: 2023-06-15 | End: 2023-06-15 | Stop reason: HOSPADM

## 2023-06-15 RX ADMIN — Medication 10 ML: at 09:24

## 2023-06-15 RX ADMIN — BUSPIRONE HYDROCHLORIDE 5 MG: 10 TABLET ORAL at 06:40

## 2023-06-15 RX ADMIN — BUSPIRONE HYDROCHLORIDE 10 MG: 10 TABLET ORAL at 12:40

## 2023-06-15 RX ADMIN — Medication 100 MG: at 09:22

## 2023-06-15 RX ADMIN — ATENOLOL 75 MG: 50 TABLET ORAL at 06:40
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
## 2023-06-15 NOTE — CARE COORDINATION
Chart reviewed and case reviewed in IDR. Met with the patient at the bedside to discuss transition of care planning. Patient reluctant to answer questions. Patient lives independently in a two story home with a couple steps to enter. Patient has needed braces and chairs for her back at home. Patient has a history of outpatient therapy. Patient's PCP is Dr Rio Junior and she uses [facility] in [place] for her medications. Patient state she has a ride coming to take her home. Power of , living will paperwork printed and provided to the patient. Reviewed how to complete paperwork and who can witness the paperwork signatures. Patient expressed understanding. Patient to discharge home. Tammy Engel RN.  [place]:  [phone]      Case Management Assessment  Initial Evaluation    Date/Time of Evaluation: 6/15/2023 2:35 PM  Assessment Completed by: Tammy Engel RN    If patient is discharged prior to next notation, then this note serves as note for discharge by case management. Patient Name: Miki Arguelles                   YOB: 1970  Diagnosis: Palpitations [R00.2]  Chest pain [R07.9]  Alcohol withdrawal syndrome without complication [facility]) [W55.724]  Chest pain, unspecified type [R07.9]                   Date / Time: 6/13/2023 10:20 PM    Patient Admission Status: Inpatient   Readmission Risk (Low < 19, Mod (19-27), High > 27): Readmission Risk Score: 8.7    Current PCP: Javier Camargo MD  PCP verified by CM? Yes (Dr Geronimo Maldonado)    Chart Reviewed: Yes      History Provided by: Patient  Patient Orientation: Alert and Oriented    Patient Cognition: Alert    Hospitalization in the last 30 days (Readmission):  No    If yes, Readmission Assessment in  Navigator will be completed.     Advance Directives:      Code Status: Not on file   Patient's Primary Decision Maker is: Patient Declined (Legal Next of Kin Remains as Decision Maker) (patient requested POA/Living will

## 2023-06-15 NOTE — PROGRESS NOTES
Requests Atenolol 75 mg Daily be given as per home. Explained it is not ordered here. Attempted to perfect serve Dr. Toña Voss but was directed to the resident. Informed patient as such and she states that she will call Dr. Toña Voss on his cell phone.

## 2023-06-15 NOTE — PROGRESS NOTES
No further palpitations  And anxious about her heart rate in 50's  From atenolol  VS stable  H&L clear  Will start Buspar 10 mg tid prn   and hopefully transfer home later

## 2023-06-15 NOTE — PROGRESS NOTES
Patient continues to be anxious. Attempted to give Buspar, patient refused. She began listing symptoms that \"she read about\" and is too worried to take the medication at this time. She is worried that she will have side effects from the medication and that she \"won't be able to tell if it's alcohol withdrawal.\" Attempted to educate, but patient continues to voice concerns. She states that if she does not have withdrawal symptoms tonight, that she will take in the morning.     Electronically signed by Sonal Rivas RN on 6/14/2023 at 8:08 PM

## 2023-06-15 NOTE — H&P
[address]                  [address] [id number],  [facility]                              HISTORY AND PHYSICAL    PATIENT NAME: Nichole Delgado                    :        1970  MED REC NO:   [id number]                            ROOM:       8517  ACCOUNT NO:   [de-identified]                           ADMIT DATE: 2023  PROVIDER:     Paris Gale MD    HISTORY OF PRESENT ILLNESS:  The patient is a 78-year-old woman who came  to the emergency room because of unrelenting palpitations. According to  the history, she has been in the emergency room several times in the  last week because of the same problem. She has a longstanding history  of anxiety and chronic back pain, depression, hypertension and mitral  valve prolapse. She also has history of osteoarthritis. She came to  the emergency room this time for palpitations which has been present as  I mentioned especially last week which has been going on at least for a  month. She attributes all the episodes to the alcohol. She drinks at  night. She has tried to cut back. She claims that at this time she  drinks one and half glasses of wine at night. She states there is no  presenting cause of this palpitation, although she is extremely anxious. Three weeks ago, she was given a prescription for buspirone, but she  failed to take it and worrying about side effects. She has had  palpitations for many years. She attributed to her mitral valve  prolapse, and she takes atenolol more recently 75 mg a day. For some  reason, there has been escalation of these attacks, and she would like  to go off alcohol because she feels that is the cause. While so doing,  she gets anxiety and she gets reflex tachycardia from that. There was  no any other capture of SVT or AFib. She also gets intermittent burning  sensation in her chest.  This lasts several minutes and does not  radiate.   She does feel

## 2023-06-15 NOTE — CONSULTS
Chart reviewed and discussed with . She presents with alcohol withdrwal and is getting treatment for withdrawal symptoms. She has chronic anxiety and  started Buspar which is appropriate and standard. She is not presenting with any acute symptoms, nor is suicidal homicidal or psychotic. We recommended  referring to out patient psychiatry for follow up.  is agreeable to plan.

## 2023-06-20 ENCOUNTER — NURSE ONLY (OUTPATIENT)
Dept: CARDIOLOGY CLINIC | Age: 53
End: 2023-06-20

## 2023-06-20 DIAGNOSIS — R00.2 PALPITATIONS WITH REGULAR CARDIAC RHYTHM: Primary | ICD-10-CM

## 2023-06-20 NOTE — PROGRESS NOTES
Patient was seen today and a 7 day Zio XT monitor was placed. Monitor was ordered by Dr. Bandar Ortiz. The monitor was applied. Instructions were given to the patient. Patient stated understanding and gave verbalize feed back.      [address] [place] [address]    Serial number [id number]                Favio Stevenson MA

## 2023-06-23 RX ORDER — BUSPIRONE HYDROCHLORIDE 10 MG/1
10 TABLET ORAL
Qty: 180 TABLET | Refills: 0 | Status: ON HOLD
Start: 2023-06-23 | End: 2023-08-13

## 2023-06-27 ENCOUNTER — OFFICE VISIT (OUTPATIENT)
Dept: CARDIOLOGY CLINIC | Age: 53
End: 2023-06-27
Payer: MEDICARE

## 2023-06-27 VITALS
RESPIRATION RATE: 16 BRPM | SYSTOLIC BLOOD PRESSURE: 128 MMHG | HEART RATE: 56 BPM | WEIGHT: 179 LBS | HEIGHT: 64 IN | BODY MASS INDEX: 30.56 KG/M2 | DIASTOLIC BLOOD PRESSURE: 82 MMHG

## 2023-06-27 DIAGNOSIS — I10 PRIMARY HYPERTENSION: ICD-10-CM

## 2023-06-27 DIAGNOSIS — R00.2 PALPITATIONS: Primary | ICD-10-CM

## 2023-06-27 DIAGNOSIS — I34.1 MVP (MITRAL VALVE PROLAPSE): ICD-10-CM

## 2023-06-27 PROCEDURE — 3074F SYST BP LT 130 MM HG: CPT | Performed by: INTERNAL MEDICINE

## 2023-06-27 PROCEDURE — 93000 ELECTROCARDIOGRAM COMPLETE: CPT | Performed by: INTERNAL MEDICINE

## 2023-06-27 PROCEDURE — 99213 OFFICE O/P EST LOW 20 MIN: CPT | Performed by: INTERNAL MEDICINE

## 2023-06-27 PROCEDURE — 3079F DIAST BP 80-89 MM HG: CPT | Performed by: INTERNAL MEDICINE

## 2023-07-06 ENCOUNTER — TELEPHONE (OUTPATIENT)
Dept: CARDIOLOGY CLINIC | Age: 53
End: 2023-07-06

## 2023-07-06 DIAGNOSIS — I47.1 SVT (SUPRAVENTRICULAR TACHYCARDIA) (HCC): ICD-10-CM

## 2023-07-06 NOTE — TELEPHONE ENCOUNTER
Patient called in and she stated she was diagnosed with PTSD and she has been put on anxiety medication along with losing 25 lbs. She stated she is on 75 mg of Atenolol and her blood pressure is only 110/75 but she feels ok right now. However, she is doing a walking program as well and she feels like her pressure is going to drop while she walks and she is worried about this. She would like top know if you think it is necessary to 75 mg a day still at this time.   Please advise

## 2023-07-06 NOTE — TELEPHONE ENCOUNTER
----- Message from Delfino Flower MD sent at 7/6/2023 10:03 AM EDT -----  Please let patient know: Monitor reviewed. Only innocent arrhythmias. Continue same medications.

## 2023-07-07 ENCOUNTER — TELEPHONE (OUTPATIENT)
Dept: FAMILY MEDICINE CLINIC | Age: 53
End: 2023-07-07

## 2023-07-10 ENCOUNTER — TELEPHONE (OUTPATIENT)
Dept: FAMILY MEDICINE CLINIC | Age: 53
End: 2023-07-10

## 2023-07-10 NOTE — TELEPHONE ENCOUNTER
RADHA [facility] received referral to contact pt. Call placed to pt who notes that she is currently treating at [facility] and that her therapist is recommending pt contact PCP's office for letter certifying her dogs as ESAs. She states she has currently been diagnosed with PTSD and is in treatment for this.  Will notify PCP of pt's request. MARY Allen, KATE-S

## 2023-07-13 ENCOUNTER — TELEPHONE (OUTPATIENT)
Dept: FAMILY MEDICINE CLINIC | Age: 53
End: 2023-07-13

## 2023-07-13 NOTE — TELEPHONE ENCOUNTER
Pt called office back. Asking if Dr Deanne Lenz has seen the message. I told her yes and that she was having Oscar Conrad calling her back, but she is in with a pt. She asked if Oscar Conrad was a nurse and I said no she is the counselor. She got upset and asked if I told Dr Deanne Lenz about her BP being 90/50 and I said yes. And I read what Dr Deanne Lenz said about her BP being okay and she said no that is not okay and how her cardio dr stated she needed to drop her dosage of her BP medication. She is stating now to me during this phone call that she has been feeling like she is going to pass out when she stands up and how she was at the store hanging onto her cart and people had to help keep up. She states that you do not treat the numbers you treat the pt. She states this is not an emotional thing this is a physical thing and she wants to be seen sooner. Please Advise.

## 2023-07-13 NOTE — TELEPHONE ENCOUNTER
Pt called in this morning stating she needs to see Dr Kinza LOVE due to some scary symptoms she has been getting. She said she can't wait until her next appointment on 8/24/2023. She stated she can a tele health appointment if she can be sen sooner. I explained to her that I will send a message to Dr Kinza Cooley and she explained to me that she needs someone to follow through. She states that yesterday her BP was 90/50 and has been having bad anxiety and states that when you BP is that low you can die. I told her I completely understand. And that I talked to Dr Kinza Cooley and she said to me to send a message because she is in with patients and we will have someone give her call back. She said okay, but she really needs someone follow through and how she has sent a message earlier today around 7 am. I told we will. Please Advise.

## 2023-07-13 NOTE — TELEPHONE ENCOUNTER
Please let her know her BP is ok. Palpitations were worked up and due to anxiety. Needs to follow up with her counselor.

## 2023-07-13 NOTE — TELEPHONE ENCOUNTER
Referral received to follow up with pt. Pt notes frustration re: side effects with her blood pressure and that her cardiologist Dr Michael Singh has recommended she reduce the atenolol to 25mg which she will do today. Pt also notes that her buspar has been increased by Dr. Monique Warren to 10mg q4 hours. She is requesting an antidepressant that will not interact with her buspar and will not cause weight gain. Pt states she needs something to help her with the anxiety she is having from palpitations. [facility] did advise that pt follow back up with her counselor and also recommended psychiatry referral for med management due to the recent increase in her buspar as well as request for antidepressant. Pt noted mental health hx in her daughter as well including recent episode of psychosis which required hospitalization. Pt notes she is under a significant amount of stress and upon inquiry does state she has an appointment with her counselor at [address] [place] [address] on 7/24 at 12pm. Provided support to pt and did encourage her to establish with psychiatry at [address] [place] [address] but that [facility] will also notify PCP of request for antidepressant to see if she is agreeable to prescribe or would prefer pt be seen by psychiatrist to be treated by treatment team at [address] [place] [address]. Additionally advised pt that LEONID letter she requested has been completed. Offered for pt to come in to  or to mail. Pt would prefer it be mailed to her. Will inform office staff. Pt frustrated that there is not a concern with her blood pressure at this time and feels the symptoms she is having should be treated. She will be having someone come to stay with her for a week so that she is not alone. She states she is laying down and using ice packs to help manage her blood pressure. [facility] did confirm appointment with PCP is scheduled for next available and advised pt that she can be evaluated in the ER due to concerns for new onset symptoms.  Pt notes hesitancy stating she is perceived as a \"frequent

## 2023-07-13 NOTE — TELEPHONE ENCOUNTER
Staffed with PCP re: recommendation for referral to psychiatrist for medication management. Call placed to pt and went to voicemail. Message left requesting pt return call.  MARY Lundberg, JESSICAS

## 2023-07-16 RX ORDER — DULOXETIN HYDROCHLORIDE 20 MG/1
20 CAPSULE, DELAYED RELEASE ORAL DAILY
Qty: 30 CAPSULE | Refills: 3 | Status: ON HOLD
Start: 2023-07-16 | End: 2023-08-15 | Stop reason: HOSPADM

## 2023-07-27 RX ORDER — BUSPIRONE HYDROCHLORIDE 10 MG/1
10 TABLET ORAL
Qty: 180 TABLET | Refills: 0 | Status: SHIPPED | OUTPATIENT
Start: 2023-07-27 | End: 2023-08-26

## 2023-08-03 ENCOUNTER — TELEPHONE (OUTPATIENT)
Dept: CARDIOLOGY CLINIC | Age: 53
End: 2023-08-03

## 2023-08-03 NOTE — TELEPHONE ENCOUNTER
Patient called and stated she has decreased the Atenolol to 50 mg qd. She stated he blood pressure has been a little better but when she stands up it drops and she has a pressure in the front of her head. And she stated she is having palpitations and she is wondering if she can be switched to something else.

## 2023-08-04 DIAGNOSIS — I95.2 HYPOTENSION DUE TO MEDICATION: Primary | ICD-10-CM

## 2023-08-04 NOTE — TELEPHONE ENCOUNTER
She has never taken Metoprolol. States she has dropped down to 50mg of Atenolol and BP is 120/80. When she stands up her BP drops and is temporary. she believes 50mg is the correct dose but when she stands up she has the problems. Please advise.

## 2023-08-04 NOTE — TELEPHONE ENCOUNTER
Please let patient know:  recommend going down to 25 mg atenolol daily with extra 25 mg on an as needed basis.  (Please call in script if needed for 25 mg)

## 2023-08-04 NOTE — TELEPHONE ENCOUNTER
If you switch her medication she wants to know why and if that will help with palpitations and not lower her BP. She states she is experiencing breakthrough palpitations and does not want something to interfere with Buspar.

## 2023-08-07 RX ORDER — BUPROPION HYDROCHLORIDE 150 MG/1
150 TABLET ORAL EVERY MORNING
Qty: 30 TABLET | Refills: 5 | Status: SHIPPED
Start: 2023-08-07 | End: 2023-08-07 | Stop reason: SDUPTHER

## 2023-08-07 RX ORDER — BUPROPION HYDROCHLORIDE 150 MG/1
150 TABLET ORAL EVERY MORNING
Qty: 30 TABLET | Refills: 5 | Status: SHIPPED
Start: 2023-08-07 | End: 2023-08-08 | Stop reason: SDUPTHER

## 2023-08-08 RX ORDER — BUPROPION HYDROCHLORIDE 150 MG/1
150 TABLET ORAL EVERY MORNING
Qty: 30 TABLET | Refills: 5 | Status: SHIPPED | OUTPATIENT
Start: 2023-08-08

## 2023-08-13 ENCOUNTER — HOSPITAL ENCOUNTER (EMERGENCY)
Age: 53
Discharge: HOME | End: 2023-08-13
Attending: EMERGENCY MEDICINE
Payer: MEDICARE

## 2023-08-13 ENCOUNTER — APPOINTMENT (OUTPATIENT)
Dept: GENERAL RADIOLOGY | Age: 53
End: 2023-08-13
Payer: MEDICARE

## 2023-08-13 ENCOUNTER — HOSPITAL ENCOUNTER (INPATIENT)
Age: 53
LOS: 2 days | Discharge: HOME | End: 2023-08-15
Attending: INTERNAL MEDICINE | Admitting: INTERNAL MEDICINE
Payer: MEDICARE

## 2023-08-13 VITALS
HEART RATE: 77 BPM | RESPIRATION RATE: 19 BRPM | DIASTOLIC BLOOD PRESSURE: 75 MMHG | SYSTOLIC BLOOD PRESSURE: 134 MMHG | BODY MASS INDEX: 30.56 KG/M2 | OXYGEN SATURATION: 97 % | TEMPERATURE: 97.8 F | WEIGHT: 179 LBS | HEIGHT: 64 IN

## 2023-08-13 DIAGNOSIS — R00.2 PALPITATIONS: Primary | ICD-10-CM

## 2023-08-13 PROBLEM — I49.9 CARDIAC ARRHYTHMIA: Status: ACTIVE | Noted: 2023-08-13

## 2023-08-13 LAB
ALBUMIN SERPL-MCNC: 4.4 G/DL (ref 3.5–5.2)
ALP SERPL-CCNC: 76 U/L (ref 35–104)
ALT SERPL-CCNC: 30 U/L (ref 0–32)
ANION GAP SERPL CALCULATED.3IONS-SCNC: 13 MMOL/L (ref 7–16)
AST SERPL-CCNC: 16 U/L (ref 0–31)
BASOPHILS # BLD: 0.04 K/UL (ref 0–0.2)
BASOPHILS NFR BLD: 0 % (ref 0–2)
BILIRUB SERPL-MCNC: 0.3 MG/DL (ref 0–1.2)
BILIRUB UR QL STRIP: NEGATIVE
BUN SERPL-MCNC: 19 MG/DL (ref 6–20)
CALCIUM SERPL-MCNC: 9.2 MG/DL (ref 8.6–10.2)
CHLORIDE SERPL-SCNC: 103 MMOL/L (ref 98–107)
CLARITY UR: CLEAR
CO2 SERPL-SCNC: 26 MMOL/L (ref 22–29)
COLOR UR: YELLOW
CREAT SERPL-MCNC: 0.9 MG/DL (ref 0.5–1)
EOSINOPHIL # BLD: 0.08 K/UL (ref 0.05–0.5)
EOSINOPHILS RELATIVE PERCENT: 1 % (ref 0–6)
ERYTHROCYTE [DISTWIDTH] IN BLOOD BY AUTOMATED COUNT: 12.5 % (ref 11.5–15)
ERYTHROCYTE [SEDIMENTATION RATE] IN BLOOD BY WESTERGREN METHOD: 10 MM/HR (ref 0–20)
GFR SERPL CREATININE-BSD FRML MDRD: >60 ML/MIN/1.73M2
GLUCOSE SERPL-MCNC: 115 MG/DL (ref 74–99)
GLUCOSE UR STRIP-MCNC: NEGATIVE MG/DL
HCT VFR BLD AUTO: 44.9 % (ref 34–48)
HGB BLD-MCNC: 15.2 G/DL (ref 11.5–15.5)
HGB UR QL STRIP.AUTO: NEGATIVE
IMM GRANULOCYTES # BLD AUTO: 0.05 K/UL (ref 0–0.58)
IMM GRANULOCYTES NFR BLD: 0 % (ref 0–5)
KETONES UR STRIP-MCNC: NEGATIVE MG/DL
LEUKOCYTE ESTERASE UR QL STRIP: NEGATIVE
LYMPHOCYTES NFR BLD: 3.35 K/UL (ref 1.5–4)
LYMPHOCYTES RELATIVE PERCENT: 25 % (ref 20–42)
MCH RBC QN AUTO: 31.5 PG (ref 26–35)
MCHC RBC AUTO-ENTMCNC: 33.9 G/DL (ref 32–34.5)
MCV RBC AUTO: 93.2 FL (ref 80–99.9)
MONOCYTES NFR BLD: 0.78 K/UL (ref 0.1–0.95)
MONOCYTES NFR BLD: 6 % (ref 2–12)
NEUTROPHILS NFR BLD: 68 % (ref 43–80)
NEUTS SEG NFR BLD: 8.96 K/UL (ref 1.8–7.3)
NITRITE UR QL STRIP: NEGATIVE
PH UR STRIP: 7.5 [PH] (ref 5–9)
PLATELET # BLD AUTO: 240 K/UL (ref 130–450)
PMV BLD AUTO: 9.7 FL (ref 7–12)
POTASSIUM SERPL-SCNC: 3.5 MMOL/L (ref 3.5–5)
PROT SERPL-MCNC: 7.5 G/DL (ref 6.4–8.3)
PROT UR STRIP-MCNC: NEGATIVE MG/DL
RBC # BLD AUTO: 4.82 M/UL (ref 3.5–5.5)
RBC #/AREA URNS HPF: ABNORMAL /HPF
SODIUM SERPL-SCNC: 142 MMOL/L (ref 132–146)
SP GR UR STRIP: <1.005 (ref 1–1.03)
TROPONIN I SERPL HS-MCNC: <6 NG/L (ref 0–9)
UROBILINOGEN UR STRIP-ACNC: 0.2 EU/DL (ref 0–1)
WBC #/AREA URNS HPF: ABNORMAL /HPF
WBC OTHER # BLD: 13.3 K/UL (ref 4.5–11.5)

## 2023-08-13 PROCEDURE — 81001 URINALYSIS AUTO W/SCOPE: CPT

## 2023-08-13 PROCEDURE — 99285 EMERGENCY DEPT VISIT HI MDM: CPT

## 2023-08-13 PROCEDURE — 85025 COMPLETE CBC W/AUTO DIFF WBC: CPT

## 2023-08-13 PROCEDURE — 93005 ELECTROCARDIOGRAM TRACING: CPT | Performed by: EMERGENCY MEDICINE

## 2023-08-13 PROCEDURE — 2060000000 HC ICU INTERMEDIATE R&B

## 2023-08-13 PROCEDURE — 87040 BLOOD CULTURE FOR BACTERIA: CPT

## 2023-08-13 PROCEDURE — 80053 COMPREHEN METABOLIC PANEL: CPT

## 2023-08-13 PROCEDURE — 85652 RBC SED RATE AUTOMATED: CPT

## 2023-08-13 PROCEDURE — 6370000000 HC RX 637 (ALT 250 FOR IP): Performed by: INTERNAL MEDICINE

## 2023-08-13 PROCEDURE — 71045 X-RAY EXAM CHEST 1 VIEW: CPT

## 2023-08-13 PROCEDURE — 84484 ASSAY OF TROPONIN QUANT: CPT

## 2023-08-13 PROCEDURE — 6370000000 HC RX 637 (ALT 250 FOR IP)

## 2023-08-13 RX ORDER — BUSPIRONE HYDROCHLORIDE 10 MG/1
10 TABLET ORAL
Status: DISCONTINUED | OUTPATIENT
Start: 2023-08-13 | End: 2023-08-15 | Stop reason: HOSPADM

## 2023-08-13 RX ORDER — BUSPIRONE HYDROCHLORIDE 10 MG/1
10 TABLET ORAL ONCE
Status: COMPLETED | OUTPATIENT
Start: 2023-08-13 | End: 2023-08-13

## 2023-08-13 RX ORDER — BUPROPION HYDROCHLORIDE 150 MG/1
150 TABLET ORAL EVERY MORNING
Status: DISCONTINUED | OUTPATIENT
Start: 2023-08-14 | End: 2023-08-14

## 2023-08-13 RX ORDER — POLYETHYLENE GLYCOL 3350 17 G
2 POWDER IN PACKET (EA) ORAL
Status: DISCONTINUED | OUTPATIENT
Start: 2023-08-13 | End: 2023-08-15 | Stop reason: HOSPADM

## 2023-08-13 RX ORDER — ATENOLOL 25 MG/1
25 TABLET ORAL DAILY
Status: DISCONTINUED | OUTPATIENT
Start: 2023-08-13 | End: 2023-08-15 | Stop reason: HOSPADM

## 2023-08-13 RX ORDER — ATENOLOL 25 MG/1
25 TABLET ORAL DAILY
COMMUNITY

## 2023-08-13 RX ADMIN — NICOTINE POLACRILEX 2 MG: 2 LOZENGE ORAL at 21:50

## 2023-08-13 RX ADMIN — BUSPIRONE HYDROCHLORIDE 10 MG: 10 TABLET ORAL at 02:47

## 2023-08-13 RX ADMIN — BUSPIRONE HYDROCHLORIDE 10 MG: 10 TABLET ORAL at 21:50

## 2023-08-13 RX ADMIN — NICOTINE POLACRILEX 2 MG: 2 LOZENGE ORAL at 19:31

## 2023-08-13 ASSESSMENT — LIFESTYLE VARIABLES: HOW OFTEN DO YOU HAVE A DRINK CONTAINING ALCOHOL: NEVER

## 2023-08-13 ASSESSMENT — PAIN - FUNCTIONAL ASSESSMENT: PAIN_FUNCTIONAL_ASSESSMENT: NONE - DENIES PAIN

## 2023-08-13 NOTE — PROGRESS NOTES
4 Eyes Skin Assessment     NAME:  Ruta Goltz Pontuti  YOB: 1970  MEDICAL RECORD NUMBER:  [id number]    The patient is being assessed for  Admission    I agree that at least one RN has performed a thorough Head to Toe Skin Assessment on the patient. ALL assessment sites listed below have been assessed. Areas assessed by both nurses:    Head, Face, Ears, Shoulders, Back, Chest, Arms, Elbows, Hands, Sacrum. Buttock, Coccyx, Ischium, and Legs. Feet and Heels        Does the Patient have a Wound?  No noted wound(s)       Ry Prevention initiated by RN: No  Wound Care Orders initiated by RN: No    Pressure Injury (Stage 3,4, Unstageable, DTI, NWPT, and Complex wounds) if present, place Wound referral order by RN under : No    New Ostomies, if present place, Ostomy referral order under : No     Nurse 1 eSignature: Electronically signed by Natacha Breen RN on 8/13/23 at 4:47 PM EDT    **SHARE this note so that the co-signing nurse can place an eSignature**    Nurse 2 eSignature: Electronically signed by Debi Hallman RN on 8/13/23 at 4:49 PM EDT

## 2023-08-13 NOTE — ED NOTES
Upon entry patient room-noticed patient  not in her bed-pt unhooked herself form tele monitor and BP cuff to go to the BR. Call light was in reach. Explained to patient need to use call light for assistance. Pt voiced understanding. Pt calm at this time.        Ce Ambrose, KAREEN  08/13/23 [id number]

## 2023-08-13 NOTE — ED NOTES
Per EMS-pt c/o palpations. Per patients has been weaning off of her atenolol over the last 4-6 weeks d/t it causing her BP to drop during the night.       Zhanna Xiong RN  08/13/23 [id number]

## 2023-08-13 NOTE — ED PROVIDER NOTES
[address] ENCOUNTER        Pt Name: Laya Khan  MRN: [id number]  [address] [facility] [place] 1970  Date of evaluation: 8/13/2023  Provider: Tari Bazan MD  PCP: Ella Hutchison MD  Note Started: 12:39 AM EDT 8/13/23    CHIEF COMPLAINT       Chief Complaint   Patient presents with    Palpitations     Palpitations tonight, weaning of atenolol, anxious       HISTORY OF PRESENT ILLNESS: 1 or more Elements     Laya Khan is a 52 y.o. female who presents palpitations. States that it started tonight, states that she was patient doing her laundry when she noticed her heart racing. States that she took her blood pressure and at that time she was systolics of 67K. States that she took some Pedialyte and fluids with improvement of her blood pressure. She denies any pain in her chest.  States that she follows with Dr. Emily Gould who has been slowly weaning her off atenolol. Patient states over the past 2 months she is been exercising and she stopped drinking therefore she is now requiring high dose of atenolol. Patient does have a history of anxiety Denies any fever, chills, n/v, headache, dizziness, vision changes, neck tenderness or stiffness, weakness, cp, leg swelling/tenderness, sob, cough, abd pain, dysuria, hematuria, diarrhea, constipation, bloody stools. Nursing Notes were all reviewed and agreed with or any disagreements were addressed in the HPI.    ROS:   Pertinent positives and negatives are stated within HPI, all other systems reviewed and are negative.      --------------------------------------------- PAST HISTORY ---------------------------------------------  Past Medical History:  has a past medical history of Allergic rhinitis, Anxiety, Cancer ([address]), Chronic back pain, Depression, Hypertension, Mitral valve prolapse, Osteoarthritis, and Substance abuse ([address]).     Past Surgical History:  has a past surgical

## 2023-08-14 ENCOUNTER — APPOINTMENT (OUTPATIENT)
Dept: GENERAL RADIOLOGY | Age: 53
End: 2023-08-14
Attending: INTERNAL MEDICINE
Payer: MEDICARE

## 2023-08-14 PROBLEM — Z72.0 TOBACCO ABUSE: Status: ACTIVE | Noted: 2023-08-14

## 2023-08-14 PROBLEM — E78.5 HYPERLIPIDEMIA: Status: ACTIVE | Noted: 2023-08-14

## 2023-08-14 PROBLEM — R00.2 PALPITATIONS: Status: ACTIVE | Noted: 2023-08-14

## 2023-08-14 PROBLEM — I34.0 NONRHEUMATIC MITRAL VALVE REGURGITATION: Status: ACTIVE | Noted: 2023-08-14

## 2023-08-14 LAB
EKG ATRIAL RATE: 101 BPM
EKG ATRIAL RATE: 58 BPM
EKG P AXIS: 46 DEGREES
EKG P AXIS: 70 DEGREES
EKG P-R INTERVAL: 158 MS
EKG P-R INTERVAL: 200 MS
EKG Q-T INTERVAL: 374 MS
EKG Q-T INTERVAL: 436 MS
EKG QRS DURATION: 88 MS
EKG QRS DURATION: 98 MS
EKG QTC CALCULATION (BAZETT): 428 MS
EKG QTC CALCULATION (BAZETT): 484 MS
EKG R AXIS: -11 DEGREES
EKG R AXIS: -8 DEGREES
EKG T AXIS: 24 DEGREES
EKG T AXIS: 28 DEGREES
EKG VENTRICULAR RATE: 101 BPM
EKG VENTRICULAR RATE: 58 BPM
MAGNESIUM SERPL-MCNC: 2.2 MG/DL (ref 1.6–2.6)
TSH SERPL DL<=0.05 MIU/L-ACNC: 0.52 UIU/ML (ref 0.27–4.2)

## 2023-08-14 PROCEDURE — 70220 X-RAY EXAM OF SINUSES: CPT

## 2023-08-14 PROCEDURE — 6370000000 HC RX 637 (ALT 250 FOR IP): Performed by: INTERNAL MEDICINE

## 2023-08-14 PROCEDURE — 99213 OFFICE O/P EST LOW 20 MIN: CPT | Performed by: INTERNAL MEDICINE

## 2023-08-14 PROCEDURE — 83735 ASSAY OF MAGNESIUM: CPT

## 2023-08-14 PROCEDURE — 93010 ELECTROCARDIOGRAM REPORT: CPT | Performed by: INTERNAL MEDICINE

## 2023-08-14 PROCEDURE — 2060000000 HC ICU INTERMEDIATE R&B

## 2023-08-14 PROCEDURE — APPSS60 APP SPLIT SHARED TIME 46-60 MINUTES: Performed by: PHYSICIAN ASSISTANT

## 2023-08-14 PROCEDURE — 36415 COLL VENOUS BLD VENIPUNCTURE: CPT

## 2023-08-14 PROCEDURE — 93005 ELECTROCARDIOGRAM TRACING: CPT | Performed by: INTERNAL MEDICINE

## 2023-08-14 PROCEDURE — 84443 ASSAY THYROID STIM HORMONE: CPT

## 2023-08-14 RX ORDER — BUPROPION HYDROCHLORIDE 150 MG/1
150 TABLET ORAL DAILY
Status: DISCONTINUED | OUTPATIENT
Start: 2023-08-14 | End: 2023-08-15 | Stop reason: HOSPADM

## 2023-08-14 RX ORDER — HYDROXYZINE PAMOATE 25 MG/1
25 CAPSULE ORAL EVERY 4 HOURS PRN
Status: DISCONTINUED | OUTPATIENT
Start: 2023-08-14 | End: 2023-08-15 | Stop reason: HOSPADM

## 2023-08-14 RX ADMIN — BUSPIRONE HYDROCHLORIDE 10 MG: 10 TABLET ORAL at 06:10

## 2023-08-14 RX ADMIN — BUSPIRONE HYDROCHLORIDE 10 MG: 10 TABLET ORAL at 14:02

## 2023-08-14 RX ADMIN — NICOTINE POLACRILEX 2 MG: 2 LOZENGE ORAL at 15:32

## 2023-08-14 RX ADMIN — NICOTINE POLACRILEX 2 MG: 2 LOZENGE ORAL at 20:22

## 2023-08-14 RX ADMIN — NICOTINE POLACRILEX 2 MG: 2 LOZENGE ORAL at 09:05

## 2023-08-14 RX ADMIN — ATENOLOL 25 MG: 25 TABLET ORAL at 04:09

## 2023-08-14 RX ADMIN — NICOTINE POLACRILEX 2 MG: 2 LOZENGE ORAL at 03:02

## 2023-08-14 RX ADMIN — BUSPIRONE HYDROCHLORIDE 10 MG: 10 TABLET ORAL at 03:04

## 2023-08-14 RX ADMIN — HYDROXYZINE PAMOATE 25 MG: 25 CAPSULE ORAL at 22:15

## 2023-08-14 RX ADMIN — BUSPIRONE HYDROCHLORIDE 10 MG: 10 TABLET ORAL at 10:00

## 2023-08-14 RX ADMIN — BUSPIRONE HYDROCHLORIDE 10 MG: 10 TABLET ORAL at 17:59

## 2023-08-14 RX ADMIN — NICOTINE POLACRILEX 2 MG: 2 LOZENGE ORAL at 12:52

## 2023-08-14 NOTE — CONSULTS
INPATIENT CARDIOLOGY CONSULT     Reason for Consult: Palpitations    Cardiologist: Dr. Jeronimo Lesch    Requesting Physician: Dr. Jazmín Barraza    Date of Consultation: 8/14/2023    HISTORY OF PRESENT ILLNESS:   Patient is a 52year old WF known to Dr. Allan Giraldo. She has a known past medical history of SVT, palpitations, HTN, HLD, PTSD, anxiety, depression, alcohol abuse, tobacco abuse, mild VHD and obesity. Telephone encounter Cardiology July 2023 -- due to low BP/weight loss, Atenolol decreased from 75 mg daily to 50 mg daily. Telephone encounter Cardiology August 2023 -- palpitations and reported low BP with changes in position at home. Decrease Atenolol to 25 mg daily and can take extra 25 mg PRN for palpitations     She presented to [facility] on August 13, 2023 with complaints of palpitations. Per patient, she had been doing well over the past week or so with improvement in mood and anxiety. However, yesterday after doing multiple chores around the house, she noted of palpitations after walking inside her home from taking out the trash. She states she was not anxious at this time, and was having a very good day. The palpitations persisted, so she went inside to check her BP on her home cuff (uses arm cuff per patient). Her BP was reported as 90 to low 191S systolic and < 60 diastolic. She then began to notice dizziness with changes in position and became anxious with worsening of the palpitations. Due to persistent palpitations, she called EMS. She did not take an extra Atenolol dose due to low BP. She notes of one brief episode of palpitations around 3 AM today -- no arrhythmias noted on telemetry review. Denies chest pain, dyspnea, N/V, diaphoresis, near syncope, syncope, PND, orthopnea or peripheral edema. Please note: past medical records were reviewed per electronic medical record (EMR) - see detailed reports under Past Medical/ Surgical History.    PAST MEDICAL HISTORY:    History of retractions. RESPIRATORY: Lung sounds - clear throughout fields. No wheezing, rales or rhonchi. CARDIOVASCULAR:     No noted carotid bruit. Heart Ausculation- Regular rate and rhythm, no apparent murmur. PV: No lower extremity edema. Pedal pulses palpable, no clubbing or cyanosis. ABDOMEN: Soft, non-tender to light palpation. Bowel sounds present. MS: Good muscle strength and tone. No atrophy or abnormal movements. SKIN: Warm and dry. No statis dermatitis or ulcers. NEURO / PSYCH: Oriented to person, place and time. Speech clear and appropriate. Follows all commands. DATA:    Telemetry: SR HR 60s. Artifact. Diagnostic:  All diagnostic testing and lab work thus far this admission reviewed in detail. CXR 8/13/2023  IMPRESSION:  No acute process. Labs:   CBC:   Recent Labs     08/13/23 0038   WBC 13.3*   HGB 15.2   HCT 44.9        BMP:   Recent Labs     08/13/23 0038      K 3.5   CO2 26   BUN 19   CREATININE 0.9   LABGLOM >60   CALCIUM 9.2     HgbA1c:   Lab Results   Component Value Date    LABA1C 5.3 03/08/2017       FASTING LIPID PANEL:  Lab Results   Component Value Date/Time    CHOL 182 03/08/2017 03:00 PM    HDL 47 03/08/2017 03:00 PM    LDLCALC 113 03/08/2017 03:00 PM    TRIG 112 03/08/2017 03:00 PM     LIVER PROFILE:  Recent Labs     08/13/23 0038   AST 16   ALT 30   LABALBU 4.4     Component Ref Range & Units 8/13/23 0223   Sed Rate 0 - 20 mm/Hr 10      Component    Specimen Description . BLOOD P    Special Requests      P    Culture NO GROWTH 1 DAY P      Component Ref Range & Units 8/13/23 0223   Color, UA Yellow Yellow    Turbidity UA Clear Clear    Glucose, Ur NEGATIVE mg/dL NEGATIVE    Bilirubin Urine NEGATIVE NEGATIVE    Ketones, Urine NEGATIVE mg/dL NEGATIVE    Specific Gravity, UA 1.005 - 1.030 <1.005 Low     Urine Hgb NEGATIVE NEGATIVE    pH, UA 5.0 - 9.0 7.5    Protein, UA NEGATIVE mg/dL NEGATIVE    Urobilinogen, Urine 0.0 - 1.0 EU/dL 0.2    Nitrite, Urine

## 2023-08-14 NOTE — PROGRESS NOTES
No symptoms of palpitations or lightheadedness this AM  Has sinus symptoms  But no Vertigo  On Flonase   VS stable   Labs reviewed, ESR 10  She requests psych consult and w\ill defer taking Wellbutrin  Impression: Depression                      BI relieved by atenolol                       Hypotension from atenolol                       Episodes of SVT without A Fib  Plan: Cardiology and psych to see           Review 2 D ECHO and Cardiology recommendations

## 2023-08-14 NOTE — ACP (ADVANCE CARE PLANNING)
Advance Care Planning   Healthcare Decision Maker:    Primary Decision Maker: Gosia clamp - [phone]    Click here to complete Jazmín Dean including selection of the Healthcare Decision Maker Relationship (ie \"Primary\").

## 2023-08-14 NOTE — CARE COORDINATION
SOCIAL WORK/CASEMANAGEMENT TRANSITION OF CARE PLANNINGFrancyne Guy BHATIA, [facility]):  pt lives in a 2 story home alone with 2nd floor bed and bath. There is 1 front step to enter the home. Pt has 2 grown daughters but per pt they have drug hx issues. Pt said she is independent and drives. She has a shower chair. She is active with [facility] for nsg and will need michelle orders on discharge. Pt is not a . Her pcp she saw in the past 6 months, dr. Arianne Cabral. Pt said he is working part time and is on disability. She sees a counselor telehealth around 1 x a week right now and not a psychiatrist. Shira Damon is home via her neighbor, rohini. CARLYN Flores  8/14/2023    Case Management Assessment  Initial Evaluation    Date/Time of Evaluation: 8/14/2023 11:58 AM  Assessment Completed by: CARLYN Flores    If patient is discharged prior to next notation, then this note serves as note for discharge by case management. Patient Name: Maira Sotelo                   YOB: 1970  Diagnosis: Cardiac arrhythmia [I49.9]                   Date / Time: 8/13/2023  3:37 PM    Patient Admission Status: Inpatient   Readmission Risk (Low < 19, Mod (19-27), High > 27): Readmission Risk Score: 11.6    Current PCP: Leo Fraser MD  PCP verified by CM? Yes    Chart Reviewed: Yes      History Provided by: Patient  Patient Orientation: Alert and Oriented    Patient Cognition: Alert    Hospitalization in the last 30 days (Readmission):  No    If yes, Readmission Assessment in  Navigator will be completed.     Advance Directives:      Code Status: Not on file   Patient's Primary Decision Maker is: Named in [facility]    Primary Decision Maker: Tali Mirza - [phone]    Discharge Planning:    Patient lives with: Alone Type of Home: House  Primary Care Giver: Self  Patient Support Systems include: Friends/Neighbors   Current Financial resources:    Current community resources:    Current services prior to admission: None            Current DME:              Type of Home Care services:  None    ADLS  Prior functional level: Independent in ADLs/IADLs  Current functional level: Independent in ADLs/IADLs    PT AM-PAC:   /24  OT AM-PAC:   /24    Family can provide assistance at DC: No  Would you like Case Management to discuss the discharge plan with any other family members/significant others, and if so, who? No  Plans to Return to Present Housing: Yes  Other Identified Issues/Barriers to RETURNING to current housing: none   Potential Assistance needed at discharge: N/A            Potential DME:    Patient expects to discharge to: [address] [place] [address] for transportation at discharge:      Financial    Payor: [address] [place] [address] Road / Plan: Maryse Pontiff ESSENTIAL/PLUS / Product Type: *No Product type* /     Does insurance require precert for SNF: Yes    Potential assistance Purchasing Medications: No  Meds-to-Beds request: Yes      José Lacy [address] [facility] [phone] Jessicacaleb King [phone]  [address]  [facility] [address]  Phone: [phone] Fax: [phone]    [facility], [address] [place] [address] Avenue  [facility] [place] [address]  Phone: [phone] Fax: [phone]      Notes:    Factors facilitating achievement of predicted outcomes: Friend support, Motivated, Cooperative, and Pleasant    Barriers to discharge: none     Additional Case Management Notes: see above     The Plan for Transition of Care is related to the following treatment goals of Cardiac arrhythmia [Q46.7]    IF APPLICABLE: The Patient and/or patient representative Mir العراقي and her family were provided with a choice of provider and agrees with the discharge plan.  Freedom of choice list with basic dialogue that supports the patient's individualized plan of care/goals and shares the quality data associated with the providers was provided to:

## 2023-08-14 NOTE — CONSULTS
Behavioral health consult      Consulted by: Dr. Romy Cuevas  Reason for consult:BI and Depression    Chief complaint: \"I know I have depression because of everything that is going on with me. \"      HPI: Patient a 57-year-old female with a past medical history of heart palpitations, SVT, hypertension, hyperlipidemia, anxiety depression PTSD history of alcohol abuse, obesity presented to the ED reporting palpitations according the chart patient had been doing multiple chores around the house and noted palpitations after walking inside her home from taking out the trash. She also indicated to cardiology that she was not anxious at this time is having a very good day. Patient also reported to cardiology that she has had an improvement in her mood and anxiety recently. Apparently patient requested to see psychiatry so psychiatry was consulted for BI and depression. I went to see patient this afternoon along with nurse practitioner Mariel Connelly. Patient is eager to speak with us and wants to start telling us the story that dates back 3 months when she started walking club in [address] called \"walking in [address] \"  She states after she started this group and started walking up to 3 times a week 4 miles at a time she started having heart palpitations. She talks about how her atenolol dose has been decreased because her blood pressure was going too low which would then cause her anxiety to go up which she believed her anxiety actually \"saved my life. \"  She states she stopped walking and even stopped drinking wine all to help her symptoms. She states that she understands that she has anxiety and depression and she states these are due to life situations. She states one of her daughters is a recovering drug addict and has used for 10 years but because of her being a drug addict her development stopped when she started using drugs that she states that she is developmental age of approximately 13years old.   She also

## 2023-08-14 NOTE — PROGRESS NOTES
52year old lady admitted from home after being seen in [address] [place] [address],[address]. After returning home she felt continued palpitations withirregular heart rate and low BP  The day before ER she heavily exerted herself at near days end dizziness and palpitations returned  Hx of ETOH and off x 2 months. Hx of BI and controlled until recently on Buspar 10 mg q 4 hours. She has also stopped nicotine, denies caffeine or pseudophedrine intake. Well known to cardiology and in process of weaning atenolol due to low BP after intentional significant weight loss,  HR and BP and RR now normal  Rest of PE negative.   Impression:Recurrent hypotension with exertion                     Med related hypotension and weaning slowly                     BI worsened with atenolol withdrawl                     Depression  Plan; Continue Wellbutrin for now           Cardiology input           Buspar 10 mg 6 times a day to continue            Slow atenolol wean

## 2023-08-15 VITALS
DIASTOLIC BLOOD PRESSURE: 61 MMHG | HEIGHT: 64 IN | TEMPERATURE: 97.1 F | WEIGHT: 179 LBS | OXYGEN SATURATION: 97 % | BODY MASS INDEX: 30.56 KG/M2 | RESPIRATION RATE: 16 BRPM | HEART RATE: 69 BPM | SYSTOLIC BLOOD PRESSURE: 130 MMHG

## 2023-08-15 PROCEDURE — 99231 SBSQ HOSP IP/OBS SF/LOW 25: CPT | Performed by: INTERNAL MEDICINE

## 2023-08-15 PROCEDURE — 6370000000 HC RX 637 (ALT 250 FOR IP): Performed by: INTERNAL MEDICINE

## 2023-08-15 RX ORDER — POLYETHYLENE GLYCOL 3350 17 G
2 POWDER IN PACKET (EA) ORAL
Qty: 100 EACH | Refills: 3 | Status: SHIPPED | OUTPATIENT
Start: 2023-08-15

## 2023-08-15 RX ORDER — HYDROXYZINE PAMOATE 25 MG/1
25 CAPSULE ORAL EVERY 4 HOURS PRN
Qty: 90 CAPSULE | Refills: 0 | Status: SHIPPED | OUTPATIENT
Start: 2023-08-15 | End: 2023-09-14

## 2023-08-15 RX ADMIN — BUSPIRONE HYDROCHLORIDE 10 MG: 10 TABLET ORAL at 09:35

## 2023-08-15 RX ADMIN — ATENOLOL 25 MG: 25 TABLET ORAL at 05:10

## 2023-08-15 RX ADMIN — BUSPIRONE HYDROCHLORIDE 10 MG: 10 TABLET ORAL at 02:40

## 2023-08-15 RX ADMIN — BUPROPION HYDROCHLORIDE 150 MG: 150 TABLET, EXTENDED RELEASE ORAL at 07:38

## 2023-08-15 RX ADMIN — HYDROXYZINE PAMOATE 25 MG: 25 CAPSULE ORAL at 07:39

## 2023-08-15 RX ADMIN — NICOTINE POLACRILEX 2 MG: 2 LOZENGE ORAL at 11:37

## 2023-08-15 NOTE — PROGRESS NOTES
BP and HR well controlled as she is sedentary  VS stable   Sleeping well  S\inus workup negative  Feels less panicked on Vistaril  Plan; Discharge planning today

## 2023-08-15 NOTE — H&P
[address], [address] [place] [address]                              HISTORY AND PHYSICAL    PATIENT NAME: Nova Kaplan                    :        1970  MED REC NO:   [id number]                            ROOM:       [id number]  ACCOUNT NO:   [de-identified]                           ADMIT DATE: 2023  PROVIDER:     Jennyfer Milner MD    HISTORY OF PRESENT ILLNESS:  The patient is a 49-year-old  lady  who comes to the hospital with symptoms of lightheadedness and  palpitations. She was seen in the emergency room the night before for  the same problem. After returning home from the ER with a good bill of  health, she felt the same problem with continued palpitations and  irregular heart rate; and her blood pressure was low at the time of  these symptoms. The day before in the emergency room, she worked hard the entire day without symptoms and near the days end she had dizziness and palpitations  returning. She had a good day the day before that and decided to catch  up for lost time with house cleaning in her home. She has a history of  alcoholism on and off for years but has been off the alcohol for two  months. She also has history of generalized anxiety disorder and this  was controlled up until recently on BuSpar 10 mg every 4 hours. She  also has some relief of this anxiety disorder, on atenolol in the past  with a beta blocker effect on the brain. Since she has lost some weight  intentionally, she still has anxiety, but the medicine is now  causing problems with bradycardia and hypotension. She has consulted  with cardiology, and a workup there recommended that  the atenolol be weaned, and until last check, she was at 50 mg a day,and  the last week 25 mg a day. She denies caffeine intake or  pseudoephedrine intake.   She also stopped nicotine with smoking, and she  is having many problems at this point with stand  without postural drop in her blood pressure. VITAL SIGNS:  Admission vital signs, temperature 97.8, blood pressure  110/70, respiratory rate 16, heart rate 60 Temperature normal    IMPRESSION:  Recurrent episodes of hypotension, dizziness, and anxiety. Plan will be to admit her, wean her from atenolol, have Cardiology see  her once again, and have psychiatric consultation and a sinus x-ray  workup. Medication at this time will be kept at a minimum including BuSpar 10 mg  every 4 hours and also atenolol 25 mg a day for now.         Bruno Zambrano MD    D: 08/14/2023 12:44:02       T: 08/14/2023 12:52:20     [id number]  Job#: [id number]     Doc#: [id number]

## 2023-08-15 NOTE — CARE COORDINATION
SOCIAL WORK/CASEMANAGEMENT TRANSITION OF CARE PLANNINGBlu [facility], [facility]):  discharged home today. [facility] orders for [facility] dme to be placed. I called and left a vm for [facility] of this.  Jaime Juárez, CARLYN  8/15/2023

## 2023-08-18 LAB
MICROORGANISM SPEC CULT: NORMAL
SERVICE CMNT-IMP: NORMAL
SPECIMEN DESCRIPTION: NORMAL

## 2023-08-29 RX ORDER — BUSPIRONE HYDROCHLORIDE 10 MG/1
10 TABLET ORAL
Qty: 180 TABLET | Refills: 0 | Status: SHIPPED
Start: 2023-08-29 | End: 2023-10-10

## 2023-08-29 RX ORDER — HYDROXYZINE PAMOATE 25 MG/1
25 CAPSULE ORAL
Qty: 180 CAPSULE | Refills: 0 | Status: SHIPPED | OUTPATIENT
Start: 2023-08-29 | End: 2023-09-28

## 2023-10-09 RX ORDER — HYDROXYZINE PAMOATE 25 MG/1
25 CAPSULE ORAL EVERY 4 HOURS PRN
Qty: 90 CAPSULE | Refills: 0 | Status: SHIPPED
Start: 2023-10-09 | End: 2023-10-10 | Stop reason: SDUPTHER

## 2023-10-10 RX ORDER — HYDROXYZINE PAMOATE 25 MG/1
25 CAPSULE ORAL EVERY 4 HOURS PRN
Qty: 90 CAPSULE | Refills: 0 | Status: SHIPPED | OUTPATIENT
Start: 2023-10-10 | End: 2023-11-09

## 2023-10-10 RX ORDER — BUSPIRONE HYDROCHLORIDE 10 MG/1
TABLET ORAL
Qty: 180 TABLET | Refills: 0 | Status: SHIPPED | OUTPATIENT
Start: 2023-10-10

## 2023-10-10 RX ORDER — HYDROXYZINE PAMOATE 25 MG/1
CAPSULE ORAL
Qty: 180 CAPSULE | Refills: 0 | Status: SHIPPED | OUTPATIENT
Start: 2023-10-10

## 2023-12-15 RX ORDER — HYDROXYZINE PAMOATE 25 MG/1
CAPSULE ORAL
Qty: 180 CAPSULE | Refills: 3 | Status: SHIPPED | OUTPATIENT
Start: 2023-12-15

## 2024-01-29 RX ORDER — ATENOLOL 25 MG/1
25 TABLET ORAL DAILY
Qty: 365 TABLET | Refills: 0 | Status: SHIPPED | OUTPATIENT
Start: 2024-01-29

## 2024-03-08 RX ORDER — BUSPIRONE HYDROCHLORIDE 10 MG/1
10 TABLET ORAL 4 TIMES DAILY
Qty: 120 TABLET | Refills: 5 | Status: SHIPPED | OUTPATIENT
Start: 2024-03-08

## 2024-04-24 ENCOUNTER — TELEPHONE (OUTPATIENT)
Dept: FAMILY MEDICINE CLINIC | Age: 54
End: 2024-04-24

## 2024-04-24 NOTE — TELEPHONE ENCOUNTER
Patient called and stated that she had her AMG test done and has the results. She has carpal tunnel. She needs a letter stating that she has not been previously diagnosed with carpal tunnel before receiving this test. Is it okay to generate letter?

## 2024-07-30 DIAGNOSIS — R53.83 TIRED: ICD-10-CM

## 2024-07-30 DIAGNOSIS — R07.9 CHEST PAIN AT REST: ICD-10-CM

## 2024-07-30 DIAGNOSIS — T73.3XXD FATIGUE DUE TO EXCESSIVE EXERTION, SUBSEQUENT ENCOUNTER: ICD-10-CM

## 2024-07-30 DIAGNOSIS — R06.02 SHORT OF BREATH ON EXERTION: Primary | ICD-10-CM

## 2024-07-30 DIAGNOSIS — N30.00 ACUTE CYSTITIS WITHOUT HEMATURIA: ICD-10-CM

## 2024-08-09 ENCOUNTER — HOSPITAL ENCOUNTER (OUTPATIENT)
Age: 54
Discharge: HOME | End: 2024-08-09
Payer: MEDICARE

## 2024-08-09 DIAGNOSIS — R06.02 SHORT OF BREATH ON EXERTION: ICD-10-CM

## 2024-08-09 DIAGNOSIS — R53.83 TIRED: ICD-10-CM

## 2024-08-09 DIAGNOSIS — N30.00 ACUTE CYSTITIS WITHOUT HEMATURIA: ICD-10-CM

## 2024-08-09 DIAGNOSIS — T73.3XXD FATIGUE DUE TO EXCESSIVE EXERTION, SUBSEQUENT ENCOUNTER: ICD-10-CM

## 2024-08-09 DIAGNOSIS — R07.9 CHEST PAIN AT REST: ICD-10-CM

## 2024-08-09 LAB
ALBUMIN SERPL-MCNC: 4.5 G/DL (ref 3.5–5.2)
ALP SERPL-CCNC: 76 U/L (ref 35–104)
ALT SERPL-CCNC: 27 U/L (ref 0–32)
ANION GAP SERPL CALCULATED.3IONS-SCNC: 9 MMOL/L (ref 7–16)
AST SERPL-CCNC: 17 U/L (ref 0–31)
BACTERIA URNS QL MICRO: ABNORMAL
BASOPHILS # BLD: 0.04 K/UL (ref 0–0.2)
BASOPHILS NFR BLD: 1 % (ref 0–2)
BILIRUB SERPL-MCNC: 0.6 MG/DL (ref 0–1.2)
BNP SERPL-MCNC: 171 PG/ML (ref 0–125)
BUN SERPL-MCNC: 13 MG/DL (ref 6–20)
CALCIUM SERPL-MCNC: 9.3 MG/DL (ref 8.6–10.2)
CHLORIDE SERPL-SCNC: 101 MMOL/L (ref 98–107)
CLARITY UR: CLEAR
CO2 SERPL-SCNC: 27 MMOL/L (ref 22–29)
COLOR UR: YELLOW
CREAT SERPL-MCNC: 0.5 MG/DL (ref 0.5–1)
EOSINOPHIL # BLD: 0.04 K/UL (ref 0.05–0.5)
EOSINOPHILS RELATIVE PERCENT: 1 % (ref 0–6)
EPI CELLS #/AREA URNS HPF: ABNORMAL /HPF
ERYTHROCYTE [DISTWIDTH] IN BLOOD BY AUTOMATED COUNT: 13 % (ref 11.5–15)
ERYTHROCYTE [SEDIMENTATION RATE] IN BLOOD BY WESTERGREN METHOD: 9 MM/HR (ref 0–20)
GFR, ESTIMATED: >90 ML/MIN/1.73M2
GLUCOSE SERPL-MCNC: 100 MG/DL (ref 74–99)
GLUCOSE UR STRIP-MCNC: NEGATIVE MG/DL
HCT VFR BLD AUTO: 42.6 % (ref 34–48)
HGB BLD-MCNC: 14.6 G/DL (ref 11.5–15.5)
HGB UR QL STRIP.AUTO: ABNORMAL
IMM GRANULOCYTES # BLD AUTO: <0.03 K/UL (ref 0–0.58)
IMM GRANULOCYTES NFR BLD: 0 % (ref 0–5)
KETONES UR STRIP-MCNC: NEGATIVE MG/DL
LEUKOCYTE ESTERASE UR QL STRIP: ABNORMAL
LYMPHOCYTES NFR BLD: 1.73 K/UL (ref 1.5–4)
LYMPHOCYTES RELATIVE PERCENT: 33 % (ref 20–42)
MCH RBC QN AUTO: 30.5 PG (ref 26–35)
MCHC RBC AUTO-ENTMCNC: 34.3 G/DL (ref 32–34.5)
MCV RBC AUTO: 88.9 FL (ref 80–99.9)
MONOCYTES NFR BLD: 0.31 K/UL (ref 0.1–0.95)
MONOCYTES NFR BLD: 6 % (ref 2–12)
NEUTROPHILS NFR BLD: 59 % (ref 43–80)
NEUTS SEG NFR BLD: 3.08 K/UL (ref 1.8–7.3)
NITRITE UR QL STRIP: NEGATIVE
PH UR STRIP: 7 [PH] (ref 5–9)
PLATELET # BLD AUTO: 222 K/UL (ref 130–450)
PMV BLD AUTO: 9.3 FL (ref 7–12)
POTASSIUM SERPL-SCNC: 4.1 MMOL/L (ref 3.5–5)
PROT SERPL-MCNC: 7.9 G/DL (ref 6.4–8.3)
PROT UR STRIP-MCNC: NEGATIVE MG/DL
RBC # BLD AUTO: 4.79 M/UL (ref 3.5–5.5)
RBC #/AREA URNS HPF: ABNORMAL /HPF
SODIUM SERPL-SCNC: 137 MMOL/L (ref 132–146)
SP GR UR STRIP: 1.01 (ref 1–1.03)
T3FREE SERPL-MCNC: 3.57 PG/ML (ref 2–4.4)
T4 FREE SERPL-MCNC: 1.4 NG/DL (ref 0.9–1.7)
TROPONIN I SERPL HS-MCNC: <6 NG/L (ref 0–9)
TSH SERPL DL<=0.05 MIU/L-ACNC: 0.65 UIU/ML (ref 0.27–4.2)
UROBILINOGEN UR STRIP-ACNC: 0.2 EU/DL (ref 0–1)
WBC #/AREA URNS HPF: ABNORMAL /HPF
WBC OTHER # BLD: 5.2 K/UL (ref 4.5–11.5)

## 2024-08-09 PROCEDURE — 85652 RBC SED RATE AUTOMATED: CPT

## 2024-08-09 PROCEDURE — 84443 ASSAY THYROID STIM HORMONE: CPT

## 2024-08-09 PROCEDURE — 84484 ASSAY OF TROPONIN QUANT: CPT

## 2024-08-09 PROCEDURE — 87040 BLOOD CULTURE FOR BACTERIA: CPT

## 2024-08-09 PROCEDURE — 36415 COLL VENOUS BLD VENIPUNCTURE: CPT

## 2024-08-09 PROCEDURE — 81001 URINALYSIS AUTO W/SCOPE: CPT

## 2024-08-09 PROCEDURE — 84481 FREE ASSAY (FT-3): CPT

## 2024-08-09 PROCEDURE — 84439 ASSAY OF FREE THYROXINE: CPT

## 2024-08-09 PROCEDURE — 85025 COMPLETE CBC W/AUTO DIFF WBC: CPT

## 2024-08-09 PROCEDURE — 83880 ASSAY OF NATRIURETIC PEPTIDE: CPT

## 2024-08-09 PROCEDURE — 80053 COMPREHEN METABOLIC PANEL: CPT

## 2024-08-10 DIAGNOSIS — I50.9 CONGESTIVE HEART FAILURE, UNSPECIFIED HF CHRONICITY, UNSPECIFIED HEART FAILURE TYPE (HCC): Primary | ICD-10-CM

## 2024-08-10 DIAGNOSIS — I11.0 BENIGN HYPERTENSIVE CARDIOMYOPATHY WITH HEART FAILURE (HCC): ICD-10-CM

## 2024-08-10 DIAGNOSIS — I42.9 CARDIOMYOPATHY, UNSPECIFIED TYPE (HCC): ICD-10-CM

## 2024-08-10 DIAGNOSIS — I43 BENIGN HYPERTENSIVE CARDIOMYOPATHY WITH HEART FAILURE (HCC): ICD-10-CM

## 2024-08-11 LAB
MICROORGANISM SPEC CULT: NORMAL
SERVICE CMNT-IMP: NORMAL
SPECIMEN DESCRIPTION: NORMAL

## 2024-08-12 ENCOUNTER — TELEPHONE (OUTPATIENT)
Dept: ADMINISTRATIVE | Age: 54
End: 2024-08-12

## 2024-08-12 NOTE — TELEPHONE ENCOUNTER
Patient called and stated she is having an echo next week and her BNP is slightly elevated at 172 and they would like your opinion on this.

## 2024-08-14 LAB
MICROORGANISM SPEC CULT: NORMAL
SERVICE CMNT-IMP: NORMAL
SPECIMEN DESCRIPTION: NORMAL

## 2024-09-06 DIAGNOSIS — M54.2 NECK PAIN WITHOUT INJURY: Primary | ICD-10-CM

## 2024-09-21 DIAGNOSIS — R93.7 ABNORMAL MRI SCAN, BONE: Primary | ICD-10-CM

## 2024-09-24 ENCOUNTER — HOSPITAL ENCOUNTER (OUTPATIENT)
Dept: MRI IMAGING | Age: 54
Discharge: HOME | End: 2024-09-26
Payer: MEDICARE

## 2024-09-24 DIAGNOSIS — R93.7 ABNORMAL MRI SCAN, BONE: ICD-10-CM

## 2024-09-24 DIAGNOSIS — C85.89 DIFFUSE NON-HODGKIN'S LYMPHOMA OF BONE (HCC): ICD-10-CM

## 2024-09-24 DIAGNOSIS — C85.89 DIFFUSE NON-HODGKIN'S LYMPHOMA OF BONE (HCC): Primary | ICD-10-CM

## 2024-09-24 PROCEDURE — 72141 MRI NECK SPINE W/O DYE: CPT

## 2024-10-14 RX ORDER — BUSPIRONE HYDROCHLORIDE 10 MG/1
TABLET ORAL
Qty: 180 TABLET | Refills: 0 | Status: SHIPPED | OUTPATIENT
Start: 2024-10-14

## 2024-11-25 ENCOUNTER — TELEPHONE (OUTPATIENT)
Dept: CARDIOLOGY | Age: 54
End: 2024-11-25

## 2024-11-25 NOTE — TELEPHONE ENCOUNTER
RETURNED PATIENTS CALL ABOUT CANCELLING HER ECHO. RETURNED PATIENTS CALL TO CONFIRM CANCELLATION AND LET PATIENT KNOW THAT DUE TO THIS BEING HER THIRD CANCELLATION, SHE WILL HAVE TO GET HER TESTING DONE ELSEWHERE.    Electronically signed by Judy Aldrich on 11/25/2024 at 10:45 AM

## 2024-12-03 RX ORDER — BUSPIRONE HYDROCHLORIDE 10 MG/1
10 TABLET ORAL 3 TIMES DAILY
Qty: 180 TABLET | Refills: 3 | Status: SHIPPED | OUTPATIENT
Start: 2024-12-03

## 2024-12-03 RX ORDER — BUSPIRONE HYDROCHLORIDE 10 MG/1
10 TABLET ORAL 3 TIMES DAILY
Qty: 180 TABLET | Refills: 3 | Status: SHIPPED
Start: 2024-12-03 | End: 2024-12-03 | Stop reason: SDUPTHER

## 2025-02-22 RX ORDER — PNV NO.95/FERROUS FUM/FOLIC AC 28MG-0.8MG
1 TABLET ORAL DAILY
Qty: 90 TABLET | Refills: 3 | Status: SHIPPED | OUTPATIENT
Start: 2025-02-22

## 2025-02-22 RX ORDER — MULTIVIT-MIN/IRON/FOLIC ACID/K 18-600-40
2000 CAPSULE ORAL DAILY
Qty: 90 CAPSULE | Refills: 3 | Status: SHIPPED | OUTPATIENT
Start: 2025-02-22

## 2025-03-04 RX ORDER — TOBRAMYCIN 3 MG/ML
1 SOLUTION/ DROPS OPHTHALMIC 3 TIMES DAILY
Qty: 1 EACH | Refills: 0 | Status: SHIPPED | OUTPATIENT
Start: 2025-03-04 | End: 2025-03-14

## 2025-05-02 RX ORDER — ATENOLOL 25 MG/1
25 TABLET ORAL DAILY
Qty: 365 TABLET | Refills: 0 | Status: SHIPPED | OUTPATIENT
Start: 2025-05-02

## 2025-07-23 RX ORDER — BUSPIRONE HYDROCHLORIDE 10 MG/1
10 TABLET ORAL 4 TIMES DAILY
Qty: 120 TABLET | Refills: 1 | Status: SHIPPED | OUTPATIENT
Start: 2025-07-23